# Patient Record
Sex: FEMALE | Race: WHITE | NOT HISPANIC OR LATINO | Employment: FULL TIME | URBAN - METROPOLITAN AREA
[De-identification: names, ages, dates, MRNs, and addresses within clinical notes are randomized per-mention and may not be internally consistent; named-entity substitution may affect disease eponyms.]

---

## 2017-08-09 ENCOUNTER — HOSPITAL ENCOUNTER (OUTPATIENT)
Dept: MAMMOGRAPHY | Facility: HOSPITAL | Age: 57
Discharge: HOME | End: 2017-08-09
Attending: FAMILY MEDICINE | Admitting: FAMILY MEDICINE

## 2017-08-11 ENCOUNTER — HOSPITAL ENCOUNTER (OUTPATIENT)
Dept: FAMILY MEDICINE CLINIC | Facility: CLINIC | Age: 57
Setting detail: SPECIMEN
Discharge: HOME | End: 2017-08-11
Attending: FAMILY MEDICINE | Admitting: FAMILY MEDICINE

## 2018-01-22 ENCOUNTER — HOSPITAL ENCOUNTER (OUTPATIENT)
Dept: FAMILY MEDICINE CLINIC | Facility: CLINIC | Age: 58
Setting detail: SPECIMEN
Discharge: HOME | End: 2018-01-22
Attending: FAMILY MEDICINE | Admitting: FAMILY MEDICINE

## 2018-01-22 LAB
ALBUMIN SERPL-MCNC: 4.1 G/DL (ref 3.5–4.8)
ALBUMIN/GLOB SERPL: 1.2 {RATIO} (ref 1–1.7)
ALP SERPL-CCNC: 78 IU/L (ref 32–91)
ALT SERPL-CCNC: 21 IU/L (ref 14–54)
ANION GAP SERPL CALC-SCNC: 13.7 MMOL/L (ref 10–20)
AST SERPL-CCNC: 26 IU/L (ref 15–41)
BACTERIA SPEC AEROBE CULT: NORMAL
BASOPHILS # BLD AUTO: 0 10*3/UL (ref 0–0.2)
BASOPHILS NFR BLD AUTO: 1 % (ref 0–2)
BILIRUB SERPL-MCNC: 0.5 MG/DL (ref 0.3–1.2)
BUN SERPL-MCNC: 12 MG/DL (ref 8–20)
BUN/CREAT SERPL: 9.2 (ref 5.4–26.2)
CALCIUM SERPL-MCNC: 10 MG/DL (ref 8.9–10.3)
CHLORIDE SERPL-SCNC: 100 MMOL/L (ref 101–111)
CONV CO2: 27 MMOL/L (ref 22–32)
CONV TOTAL PROTEIN: 7.6 G/DL (ref 6.1–7.9)
CREAT UR-MCNC: 1.3 MG/DL (ref 0.4–1)
DIFFERENTIAL METHOD BLD: (no result)
EOSINOPHIL # BLD AUTO: 0.2 10*3/UL (ref 0–0.3)
EOSINOPHIL # BLD AUTO: 2 % (ref 0–3)
ERYTHROCYTE [DISTWIDTH] IN BLOOD BY AUTOMATED COUNT: 13.8 % (ref 11.5–14.5)
GLOBULIN UR ELPH-MCNC: 3.5 G/DL (ref 2.5–3.8)
GLUCOSE SERPL-MCNC: 147 MG/DL (ref 65–99)
HCT VFR BLD AUTO: 44.1 % (ref 35–49)
HGB BLD-MCNC: 14.8 G/DL (ref 12–15)
LIPASE SERPL-CCNC: 15 U/L (ref 22–51)
LYMPHOCYTES # BLD AUTO: 1.6 10*3/UL (ref 0.8–4.8)
LYMPHOCYTES NFR BLD AUTO: 21 % (ref 18–42)
Lab: NORMAL
MCH RBC QN AUTO: 28.5 PG (ref 26–32)
MCHC RBC AUTO-ENTMCNC: 33.7 G/DL (ref 32–36)
MCV RBC AUTO: 84.8 FL (ref 80–94)
MICRO REPORT STATUS: NORMAL
MONOCYTES # BLD AUTO: 0.6 10*3/UL (ref 0.1–1.3)
MONOCYTES NFR BLD AUTO: 8 % (ref 2–11)
NEUTROPHILS # BLD AUTO: 5 10*3/UL (ref 2.3–8.6)
NEUTROPHILS NFR BLD AUTO: 68 % (ref 50–75)
NRBC BLD AUTO-RTO: 0 /100{WBCS}
NRBC/RBC NFR BLD MANUAL: 0 10*3/UL
PLATELET # BLD AUTO: 291 10*3/UL (ref 150–450)
PMV BLD AUTO: 8.4 FL (ref 7.4–10.4)
POTASSIUM SERPL-SCNC: 4.7 MMOL/L (ref 3.6–5.1)
RBC # BLD AUTO: 5.2 10*6/UL (ref 4–5.4)
SODIUM SERPL-SCNC: 136 MMOL/L (ref 136–144)
SPECIMEN SOURCE: NORMAL
WBC # BLD AUTO: 7.4 10*3/UL (ref 4.5–11.5)

## 2018-08-06 ENCOUNTER — HOSPITAL ENCOUNTER (OUTPATIENT)
Dept: MAMMOGRAPHY | Facility: HOSPITAL | Age: 58
Discharge: HOME | End: 2018-08-06
Attending: FAMILY MEDICINE | Admitting: FAMILY MEDICINE

## 2019-06-11 ENCOUNTER — TRANSCRIBE ORDERS (OUTPATIENT)
Dept: ADMINISTRATIVE | Facility: HOSPITAL | Age: 59
End: 2019-06-11

## 2019-06-11 DIAGNOSIS — Z12.31 SCREENING MAMMOGRAM, ENCOUNTER FOR: Primary | ICD-10-CM

## 2019-08-04 PROBLEM — Z01.419 ENCOUNTER FOR ROUTINE GYNECOLOGICAL EXAMINATION: Status: ACTIVE | Noted: 2017-08-11

## 2019-08-04 PROBLEM — Z12.31 VISIT FOR SCREENING MAMMOGRAM: Status: ACTIVE | Noted: 2018-07-20

## 2019-08-04 PROBLEM — E78.5 HYPERLIPIDEMIA: Status: ACTIVE | Noted: 2019-08-04

## 2019-08-04 RX ORDER — ASPIRIN 81 MG/1
TABLET ORAL
COMMUNITY
Start: 2011-11-23 | End: 2020-02-14

## 2019-08-04 RX ORDER — BLOOD SUGAR DIAGNOSTIC
STRIP MISCELLANEOUS
Status: ON HOLD | COMMUNITY
Start: 2013-05-08 | End: 2023-02-15

## 2019-08-04 RX ORDER — SIMVASTATIN 10 MG
TABLET ORAL
COMMUNITY
Start: 2011-11-23 | End: 2019-08-05

## 2019-08-05 ENCOUNTER — OFFICE VISIT (OUTPATIENT)
Dept: FAMILY MEDICINE CLINIC | Facility: CLINIC | Age: 59
End: 2019-08-05

## 2019-08-05 VITALS
HEART RATE: 56 BPM | HEIGHT: 66 IN | SYSTOLIC BLOOD PRESSURE: 154 MMHG | RESPIRATION RATE: 16 BRPM | WEIGHT: 218 LBS | BODY MASS INDEX: 35.03 KG/M2 | DIASTOLIC BLOOD PRESSURE: 67 MMHG | OXYGEN SATURATION: 98 % | TEMPERATURE: 97 F

## 2019-08-05 DIAGNOSIS — Z01.411 ENCOUNTER FOR GYNECOLOGICAL EXAMINATION WITH ABNORMAL FINDING: Primary | ICD-10-CM

## 2019-08-05 DIAGNOSIS — H91.90 HEARING LOSS, UNSPECIFIED HEARING LOSS TYPE, UNSPECIFIED LATERALITY: ICD-10-CM

## 2019-08-05 PROCEDURE — 99396 PREV VISIT EST AGE 40-64: CPT | Performed by: FAMILY MEDICINE

## 2019-08-05 PROCEDURE — G0148 SCR C/V CYTO, AUTOSYS, RESCR: HCPCS

## 2019-08-05 PROCEDURE — 87624 HPV HI-RISK TYP POOLED RSLT: CPT | Performed by: FAMILY MEDICINE

## 2019-08-05 RX ORDER — ROSUVASTATIN CALCIUM 10 MG/1
20 TABLET, COATED ORAL DAILY
Refills: 5 | COMMUNITY
Start: 2019-06-12

## 2019-08-05 NOTE — PROGRESS NOTES
Subjective   Chief Complaint   Patient presents with   • Gynecologic Exam     Ron Caba is a 59 y.o. female.     Patient Care Team:  Hailey Rae MD as PCP - General    She is coming in today for her annual gynecological exam.  She reports doing well.  She reports that over the last several months she noted some hearing issues on and off, but it is not all the time.  She especially has hard time to hear people when they are turned away from her.  She is not sexually active.  She denies any vaginal discharge or bleeding.  She denies any breast concerns.Patient denies any chest pain, shortness of breath, dizziness, nausea, vomiting, or diarrhea. No visual issues reported. No headaches, numbness or tingling. No urinary issues. Patient has good appetite and denies any weight changes. No swelling reported. No emotional issues.           The following portions of the patient's history were reviewed and updated as appropriate: allergies, current medications, past family history, past medical history, past social history, past surgical history and problem list.  Past Medical History:   Diagnosis Date   • Diabetes type 1, uncontrolled (CMS/[facility])    • Hyperlipidemia    • Vitamin D deficiency      Past Surgical History:   Procedure Laterality Date   • CATARACT EXTRACTION     •  SECTION     • COLONOSCOPY  2013   • ENDOMETRIAL ABLATION       The patient has a family history of  Family History   Problem Relation Age of Onset   • Hypertension Mother    • Hyperlipidemia Mother    • Hypothyroidism Mother    • Hypertension Father    • Hyperlipidemia Father    • Osteoarthritis Father      Social History     Socioeconomic History   • Marital status: Single     Spouse name: Not on file   • Number of children: Not on file   • Years of education: Not on file   • Highest education level: Not on file   Tobacco Use   • Smoking status: Never Smoker   • Smokeless tobacco: Never Used   Substance and Sexual  "Activity   • Alcohol use: No     Frequency: Never   • Drug use: No   • Sexual activity: Yes       Review of Systems   Constitutional: Negative for activity change, appetite change, chills, fatigue, fever, unexpected weight gain and unexpected weight loss.   HENT: Positive for hearing loss. Negative for congestion, ear pain, nosebleeds, postnasal drip, tinnitus and trouble swallowing.    Eyes: Negative for blurred vision, double vision and visual disturbance.   Respiratory: Negative for cough, choking, chest tightness, shortness of breath, wheezing and stridor.    Cardiovascular: Negative for chest pain, palpitations and leg swelling.   Gastrointestinal: Negative for abdominal distention, abdominal pain, anal bleeding, constipation, diarrhea, nausea, vomiting and GERD.   Endocrine: Negative for cold intolerance, heat intolerance and polyphagia.   Genitourinary: Negative for dysuria, flank pain, frequency, hematuria, pelvic pain, urgency, vaginal pain, breast lump and breast pain.   Musculoskeletal: Negative for arthralgias, back pain, gait problem, joint swelling and neck pain.   Skin: Negative for color change, dry skin and skin lesions.   Allergic/Immunologic: Negative for environmental allergies and food allergies.   Neurological: Negative for dizziness, tremors, speech difficulty, light-headedness, numbness, headache and confusion.   Hematological: Negative for adenopathy. Does not bruise/bleed easily.   Psychiatric/Behavioral: Negative for decreased concentration, sleep disturbance, depressed mood and stress.     Visit Vitals  /67 (BP Location: Left arm, Patient Position: Sitting, Cuff Size: Adult)   Pulse 56   Temp 97 °F (36.1 °C) (Oral)   Resp 16   Ht 166.4 cm (65.5\")   Wt 98.9 kg (218 lb)   SpO2 98%   BMI 35.73 kg/m²       Current Outpatient Medications:   •  aspirin (ASPIR-LOW) 81 MG EC tablet, ASPIR-LOW 81 MG TBEC, Disp: , Rfl:   •  insulin lispro (HUMALOG) 100 UNIT/ML injection, HUMALOG 100 UNIT/ML " "SOLN, Disp: , Rfl:   •  Insulin Syringe-Needle U-100 (B-D INSULIN SYRINGE) 31G X 5/16\" 0.3 ML misc, BD SAFETYGLIDE INSULIN SYRINGE 31G X 5/16\" 0.3 ML, Disp: , Rfl:   •  rosuvastatin (CRESTOR) 10 MG tablet, Take 10 mg by mouth Daily., Disp: , Rfl: 5    Objective   Physical Exam   Constitutional: She is oriented to person, place, and time. She appears well-developed and well-nourished. No distress.   HENT:   Head: Normocephalic and atraumatic.   Right Ear: External ear normal.   Left Ear: External ear normal.   Mouth/Throat: Oropharynx is clear and moist.   Eyes: Conjunctivae and EOM are normal. Pupils are equal, round, and reactive to light. Right eye exhibits no discharge. Left eye exhibits no discharge. No scleral icterus.   Neck: Normal range of motion. Neck supple. No JVD present. No thyromegaly present.   Cardiovascular: Normal rate, regular rhythm, normal heart sounds and intact distal pulses. Exam reveals no gallop and no friction rub.   No murmur heard.  Pulmonary/Chest: Effort normal and breath sounds normal. No respiratory distress. She has no wheezes. She has no rales. Right breast exhibits no inverted nipple, no mass, no skin change and no tenderness. Left breast exhibits no inverted nipple, no mass, no nipple discharge, no skin change and no tenderness. There is no breast swelling.   Abdominal: Soft. Bowel sounds are normal. She exhibits no distension and no mass. There is no tenderness. There is no rebound and no guarding. No hernia.   Genitourinary: Cervix normal. Uterus is not enlarged, fixed, tender, mobile or exhibiting a mass.   Cervix is not parous and not nulliparous. Cervix does not exhibit motion tenderness, discharge, friability, lesion, polyp or nabothian cyst. Right adnexum displays no mass, no tenderness and no fullness. Right adnexum is present.Left adnexum displays no mass, no tenderness and no fullness. Left adnexum is present.Vagina exhibits no lesion and no loss of rugae. No erythema, " tenderness or bleeding in the vagina. No foreign body in the vagina. No signs of injury around the vagina. No vaginal discharge found.   Musculoskeletal: Normal range of motion. She exhibits no edema, tenderness or deformity.   Lymphadenopathy:     She has no cervical adenopathy.   Neurological: She is alert and oriented to person, place, and time. She displays normal reflexes. No cranial nerve deficit or sensory deficit.   Skin: Skin is warm and dry. Capillary refill takes less than 2 seconds. No rash noted. She is not diaphoretic. No erythema. No pallor.   Psychiatric: She has a normal mood and affect. Her behavior is normal. Judgment normal.   Nursing note and vitals reviewed.         Lab Results   Component Value Date    TSH 5.640 (H) 07/29/2019          Assessment/Plan   Problems Addressed this Visit        Other    Encounter for routine gynecological examination - Primary    Relevant Orders    PapIG HPV Age Gdln ACOG      Other Visit Diagnoses     Hearing loss, unspecified hearing loss type, unspecified laterality        Relevant Orders    Ambulatory Referral to ENT (Otolaryngology)        Annual GYN exam was done today.  She is scheduled for mammogram within the next few days.  She is up to speed with her colonoscopy at this point.  If it comes to her hearing loss your exam was done today and is intact.  She was however advised to get official hearing test and I will refer her to see ENT for that.  She gets her blood work done through her endocrinologist due to her history of diabetes.  I had access to some of these results today and those were reviewed and discussed with the patient.  Her hemoglobin A1c was 8 and her TSH was slightly elevated.  She has upcoming appointment with her endocrinologist actually tomorrow and those results will be addressed there.  Healthy lifestyle was discussed and reinforced.         Requested Prescriptions      No prescriptions requested or ordered in this encounter

## 2019-08-07 LAB
AGE GDLN ACOG TESTING: NORMAL
CHROM ANALY OVERALL INTERP-IMP: NORMAL
CONV .: NORMAL
CONV PERFORMED BY:: NORMAL
DX ICD CODE: NORMAL
HIV 1 & 2 AB SER-IMP: NORMAL
HPV I/H RISK 1 DNA CVX QL PROBE+SIG AMP: NEGATIVE
REF LAB TEST METHOD: NORMAL
STAT OF ADQ CVX/VAG CYTO-IMP: NORMAL

## 2019-08-09 ENCOUNTER — HOSPITAL ENCOUNTER (OUTPATIENT)
Dept: MAMMOGRAPHY | Facility: HOSPITAL | Age: 59
Discharge: HOME | End: 2019-08-09
Admitting: FAMILY MEDICINE

## 2019-08-09 DIAGNOSIS — Z12.31 SCREENING MAMMOGRAM, ENCOUNTER FOR: ICD-10-CM

## 2019-08-09 PROCEDURE — 77067 SCR MAMMO BI INCL CAD: CPT

## 2019-08-09 PROCEDURE — 77063 BREAST TOMOSYNTHESIS BI: CPT

## 2020-02-14 ENCOUNTER — OFFICE VISIT (OUTPATIENT)
Dept: FAMILY MEDICINE CLINIC | Facility: CLINIC | Age: 60
End: 2020-02-14

## 2020-02-14 ENCOUNTER — HOSPITAL ENCOUNTER (OUTPATIENT)
Dept: GENERAL RADIOLOGY | Facility: HOSPITAL | Age: 60
Discharge: HOME | End: 2020-02-14
Admitting: FAMILY MEDICINE

## 2020-02-14 VITALS
HEIGHT: 66 IN | DIASTOLIC BLOOD PRESSURE: 73 MMHG | RESPIRATION RATE: 16 BRPM | SYSTOLIC BLOOD PRESSURE: 136 MMHG | HEART RATE: 68 BPM | WEIGHT: 216 LBS | TEMPERATURE: 97.9 F | BODY MASS INDEX: 34.72 KG/M2 | OXYGEN SATURATION: 96 %

## 2020-02-14 DIAGNOSIS — M25.562 CHRONIC PAIN OF LEFT KNEE: Primary | ICD-10-CM

## 2020-02-14 DIAGNOSIS — E10.40 TYPE 1 DIABETES MELLITUS WITH DIABETIC NEUROPATHY (HCC): ICD-10-CM

## 2020-02-14 DIAGNOSIS — E78.2 MIXED HYPERLIPIDEMIA: ICD-10-CM

## 2020-02-14 DIAGNOSIS — E03.9 HYPOTHYROIDISM (ACQUIRED): ICD-10-CM

## 2020-02-14 DIAGNOSIS — G89.29 CHRONIC PAIN OF LEFT KNEE: ICD-10-CM

## 2020-02-14 DIAGNOSIS — G89.29 CHRONIC PAIN OF LEFT KNEE: Primary | ICD-10-CM

## 2020-02-14 DIAGNOSIS — M25.562 CHRONIC PAIN OF LEFT KNEE: ICD-10-CM

## 2020-02-14 PROCEDURE — 73560 X-RAY EXAM OF KNEE 1 OR 2: CPT

## 2020-02-14 PROCEDURE — 99214 OFFICE O/P EST MOD 30 MIN: CPT | Performed by: FAMILY MEDICINE

## 2020-02-14 RX ORDER — LEVOTHYROXINE SODIUM 0.03 MG/1
25 TABLET ORAL DAILY
COMMUNITY
Start: 2020-01-14 | End: 2020-07-09

## 2020-02-14 RX ORDER — ACETAMINOPHEN AND CODEINE PHOSPHATE 300; 30 MG/1; MG/1
1 TABLET ORAL NIGHTLY PRN
Qty: 10 TABLET | Refills: 0 | Status: SHIPPED | OUTPATIENT
Start: 2020-02-14 | End: 2020-04-03

## 2020-02-14 RX ORDER — PHENOL 1.4 %
600 AEROSOL, SPRAY (ML) MUCOUS MEMBRANE DAILY
COMMUNITY

## 2020-02-14 RX ORDER — PERPHENAZINE 16 MG/1
TABLET, FILM COATED ORAL
Status: ON HOLD | COMMUNITY
Start: 2020-01-18 | End: 2023-02-15

## 2020-02-14 RX ORDER — MELATONIN
1000 DAILY
COMMUNITY

## 2020-02-14 NOTE — PROGRESS NOTES
Subjective   Chief Complaint   Patient presents with   • Knee Pain     left   • Diabetes   • Hypothyroidism   • Hyperlipidemia     Ron Caba is a 60 y.o. female.     Patient Care Team:  Hailey Rae MD as PCP - General    She is coming in today to talk about the issues with her left knee.  She reports that she has been having pain in her left knee for at least 2 months.  It is getting worse however.  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  She has tried over-the-counter Aleve, which helps some.  The pain is worse with walking and she catches herself limping at times.  She also has more pain lately at night and it keeps her up at night.  She denies any swelling.  She denies any numbness or tingling.  While in the office today she also wants to follow-up on her chronic medical problems.  We are addressing her diabetes type 1, hyperlipidemia, and newly diagnosed hypothyroidism.  She recently had blood work done for her endocrinologist and she brought those results and and wants to review films.  She denies any polyuria or polydipsia.       The following portions of the patient's history were reviewed and updated as appropriate: allergies, current medications, past family history, past medical history, past social history, past surgical history and problem list.  Past Medical History:   Diagnosis Date   • Diabetes type 1, uncontrolled (CMS/HCC)    • Hyperlipidemia    • Hypothyroidism    • Vitamin D deficiency      Past Surgical History:   Procedure Laterality Date   • CATARACT EXTRACTION     •  SECTION     • COLONOSCOPY  2013   • ENDOMETRIAL ABLATION       The patient has a family history of  Family History   Problem Relation Age of Onset   • Hypertension Mother    • Hyperlipidemia Mother    • Hypothyroidism Mother    • Hypertension Father    •  "Hyperlipidemia Father    • Osteoarthritis Father      Social History     Socioeconomic History   • Marital status: Single     Spouse name: Not on file   • Number of children: Not on file   • Years of education: Not on file   • Highest education level: Not on file   Tobacco Use   • Smoking status: Never Smoker   • Smokeless tobacco: Never Used   Substance and Sexual Activity   • Alcohol use: No     Frequency: Never   • Drug use: No   • Sexual activity: Yes       Review of Systems   Constitutional: Negative for activity change, fatigue and fever.   Respiratory: Negative for cough, shortness of breath and wheezing.    Cardiovascular: Negative for chest pain, palpitations and leg swelling.   Gastrointestinal: Negative for constipation, diarrhea and indigestion.   Musculoskeletal: Positive for arthralgias. Negative for back pain, gait problem and joint swelling.   Skin: Negative for color change, dry skin and rash.   Neurological: Negative for tremors and headache.     Visit Vitals  /73 (BP Location: Left arm, Patient Position: Sitting, Cuff Size: Large Adult)   Pulse 68   Temp 97.9 °F (36.6 °C) (Oral)   Resp 16   Ht 166.4 cm (65.5\")   Wt 98 kg (216 lb)   SpO2 96%   BMI 35.40 kg/m²       Current Outpatient Medications:   •  calcium carbonate (OS-CAT) 600 MG tablet, Take 600 mg by mouth Daily., Disp: , Rfl:   •  cholecalciferol (VITAMIN D3) 25 MCG (1000 UT) tablet, Take 1,000 Units by mouth Daily., Disp: , Rfl:   •  acetaminophen-codeine (TYLENOL #3) 300-30 MG per tablet, Take 1 tablet by mouth At Night As Needed for Moderate Pain ., Disp: 10 tablet, Rfl: 0  •  CONTOUR NEXT TEST test strip, EST BLOOD SUGAR SIX TIMES D, Disp: , Rfl:   •  Insulin Syringe-Needle U-100 (B-D INSULIN SYRINGE) 31G X 5/16\" 0.3 ML misc, BD SAFETYGLIDE INSULIN SYRINGE 31G X 5/16\" 0.3 ML, Disp: , Rfl:   •  levothyroxine (SYNTHROID, LEVOTHROID) 25 MCG tablet, Take 25 mcg by mouth Daily., Disp: , Rfl:   •  NOVOLOG 100 UNIT/ML injection, INJECT " 100 UNITS INTO THE SKIN QD, Disp: , Rfl:   •  rosuvastatin (CRESTOR) 10 MG tablet, Take 10 mg by mouth Daily., Disp: , Rfl: 5    Objective   Physical Exam   Constitutional: She is oriented to person, place, and time. She appears well-developed and well-nourished.   HENT:   Head: Normocephalic and atraumatic.   Eyes: Pupils are equal, round, and reactive to light. Conjunctivae and EOM are normal.   Neck: Normal range of motion. Neck supple.   Cardiovascular: Normal rate, regular rhythm, normal heart sounds and intact distal pulses. Exam reveals no gallop.   No murmur heard.  Pulmonary/Chest: Effort normal and breath sounds normal. No respiratory distress. She has no rales. She exhibits no tenderness.   Musculoskeletal: Normal range of motion. She exhibits no edema or deformity.   Left knee was examined.  No obvious deformity or swelling noted.  Full range of motion, crepitus noted with passive movements.  There is some mild joint line palpation tenderness.   Neurological: She is alert and oriented to person, place, and time.   Skin: Skin is warm and dry.   Nursing note and vitals reviewed.           No visits with results within 7 Day(s) from this visit.   Latest known visit with results is:   Office Visit on 08/05/2019   Component Date Value Ref Range Status   • Age Gdln ACOG Testing 08/05/2019 30-65   Final   • Diagnosis 08/05/2019 Comment   Final    NEGATIVE FOR INTRAEPITHELIAL LESION OR MALIGNANCY.   • Specimen adequacy: 08/05/2019 Comment   Final    Satisfactory for evaluation.  Endocervical and/or squamous metaplastic  cells (endocervical component) are present.   • Clinician Provided ICD-10: 08/05/2019 Comment   Final    Z01.411   • Performed by: 08/05/2019 Comment   Final    Alisa Trent, Cytotechnologist (ASCP)   • . 08/05/2019 .   Final   • Note: 08/05/2019 Comment   Final    The Pap smear is a screening test designed to aid in the detection of  premalignant and malignant conditions of the uterine cervix.   It is not a  diagnostic procedure and should not be used as the sole means of detecting  cervical cancer.  Both false-positive and false-negative reports do occur.   • Method: 08/05/2019 Comment   Final    This liquid based ThinPrep(R) pap test was screened with the  use of an image guided system.   • HPV, high-risk 08/05/2019 Negative  Negative Final    This high-risk HPV test detects thirteen high-risk types  (16/18/31/33/35/39/45/51/52/56/58/59/68) without differentiation.         Lab Results   Component Value Date     (H) 01/20/2020    BUN 16 01/20/2020    CREATININE 0.9 01/20/2020     01/20/2020    K 4.3 01/20/2020     01/20/2020    CALCIUM 9.5 01/20/2020    ALBUMIN 4.0 01/20/2020    BILITOT 0.5 01/20/2020    ALKPHOS 77 01/20/2020    AST 21 01/20/2020    ALT 14 01/20/2020    CHLPL 169 01/20/2020    TRIG 55 01/20/2020    HDL 81 01/20/2020    VLDL 11 01/20/2020    LDL 77 01/20/2020    TSH 3.030 01/20/2020    KOYM59DI 35 01/20/2020          Assessment/Plan   Problems Addressed this Visit        Cardiovascular and Mediastinum    Hyperlipidemia       Endocrine    Type 1 diabetes mellitus with diabetic neuropathy (CMS/HCC)    Relevant Medications    NOVOLOG 100 UNIT/ML injection    Hypothyroidism (acquired)    Relevant Medications    levothyroxine (SYNTHROID, LEVOTHROID) 25 MCG tablet       Musculoskeletal and Integument    Chronic pain of left knee - Primary    Relevant Medications    acetaminophen-codeine (TYLENOL #3) 300-30 MG per tablet    Other Relevant Orders    XR Knee 1 or 2 View Left    MRI Knee Left Without Contrast        If it comes to her left knee problems this might be due to combination of arthritis and soft tissue injury.  I will be getting x-ray and getting MRI.  She will continue Aleve over-the-counter and I also gave her a few pain pills to take at night to help her with pain and help her rest better as pain keeps her up at night.  I also reviewed her medical problems and her  medications.  I also reviewed her blood work done on January 20 this year.  Her hemoglobin A1c was 7.6.  Her diabetes is pretty well controlled.  She was recently also diagnosed with hypothyroidism and she was just recently started on levothyroxine 25 mcg.             Requested Prescriptions     Signed Prescriptions Disp Refills   • acetaminophen-codeine (TYLENOL #3) 300-30 MG per tablet 10 tablet 0     Sig: Take 1 tablet by mouth At Night As Needed for Moderate Pain .

## 2020-02-26 ENCOUNTER — HOSPITAL ENCOUNTER (OUTPATIENT)
Dept: MRI IMAGING | Facility: HOSPITAL | Age: 60
Discharge: HOME | End: 2020-02-26
Admitting: FAMILY MEDICINE

## 2020-02-26 DIAGNOSIS — G89.29 CHRONIC PAIN OF LEFT KNEE: ICD-10-CM

## 2020-02-26 DIAGNOSIS — M25.562 CHRONIC PAIN OF LEFT KNEE: ICD-10-CM

## 2020-02-26 PROCEDURE — 73721 MRI JNT OF LWR EXTRE W/O DYE: CPT

## 2020-02-27 DIAGNOSIS — G89.29 CHRONIC PAIN OF LEFT KNEE: Primary | ICD-10-CM

## 2020-02-27 DIAGNOSIS — M25.562 CHRONIC PAIN OF LEFT KNEE: Primary | ICD-10-CM

## 2020-03-06 ENCOUNTER — OFFICE VISIT (OUTPATIENT)
Dept: ORTHOPEDIC SURGERY | Facility: CLINIC | Age: 60
End: 2020-03-06

## 2020-03-06 VITALS
SYSTOLIC BLOOD PRESSURE: 134 MMHG | BODY MASS INDEX: 35.99 KG/M2 | DIASTOLIC BLOOD PRESSURE: 76 MMHG | HEIGHT: 65 IN | WEIGHT: 216 LBS | HEART RATE: 56 BPM | RESPIRATION RATE: 18 BRPM

## 2020-03-06 DIAGNOSIS — M22.8X2 PATELLAR MALTRACKING, LEFT: ICD-10-CM

## 2020-03-06 DIAGNOSIS — G89.29 CHRONIC PAIN OF LEFT KNEE: Primary | ICD-10-CM

## 2020-03-06 DIAGNOSIS — M25.562 CHRONIC PAIN OF LEFT KNEE: Primary | ICD-10-CM

## 2020-03-06 PROCEDURE — 99203 OFFICE O/P NEW LOW 30 MIN: CPT | Performed by: FAMILY MEDICINE

## 2020-03-06 RX ORDER — MELOXICAM 15 MG/1
15 TABLET ORAL DAILY
Qty: 30 TABLET | Refills: 0 | Status: SHIPPED | OUTPATIENT
Start: 2020-03-06 | End: 2020-03-31

## 2020-03-06 NOTE — PROGRESS NOTES
Primary Care Sports Medicine Office Visit Note     Patient ID: Ron Caba is a 60 y.o. female.    Chief Complaint:  Chief Complaint   Patient presents with   • Left Knee - Initial Evaluation   Answers for HPI/ROS submitted by the patient on 3/3/2020   What is the primary reason for your visit?: Other  Please describe your symptoms.: Pain around the knee cap left knee  Have you had these symptoms before?: No  How long have you been having these symptoms?: 1-4 weeks ago  Please describe any probable cause for these symptoms. : Not sure    HPI:    Ms. Ron Caba is a 60 y.o. female who presents to the clinic today for L knee. 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 She states she took a step backward and felt an odd hyperextension. Moderate crepitus with extending the knee. Pain keeps her up at night. Has tried ice, heat, tylenol 3, NSAID's. No instability. No significant injury or fall. No history of sx on this knee.     Past Medical History:   Diagnosis Date   • Diabetes type 1, uncontrolled (CMS/HCC)    • Hyperlipidemia    • Hypothyroidism    • Vitamin D deficiency        Past Surgical History:   Procedure Laterality Date   • CATARACT EXTRACTION     •  SECTION     • COLONOSCOPY  2013   • ENDOMETRIAL ABLATION         Family History   Problem Relation Age of Onset   • Hypertension Mother    • Hyperlipidemia Mother    • Hypothyroidism Mother    • Hypertension Father    • Hyperlipidemia Father    • Osteoarthritis Father      Social History     Occupational History   • Not on file   Tobacco Use   • Smoking status: Never Smoker   • Smokeless tobacco: Never Used   Substance and Sexual Activity   • Alcohol use: No     Frequency: Never   • Drug use: No   • Sexual activity: Yes      Review of Systems   Constitutional: Negative for activity change and fever.   Respiratory: Negative for cough and shortness of breath.   "  Cardiovascular: Negative for chest pain.   Gastrointestinal: Negative for constipation, diarrhea, nausea and vomiting.   Musculoskeletal: Positive for arthralgias.   Skin: Negative for color change and rash.   Neurological: Negative for weakness.   Hematological: Does not bruise/bleed easily.       Objective:    /76 (BP Location: Left arm, Patient Position: Sitting, Cuff Size: Large Adult)   Pulse 56   Resp 18   Ht 165.1 cm (65\")   Wt 98 kg (216 lb)   BMI 35.94 kg/m²     Physical Examination:  Physical Exam   Constitutional: She appears well-developed and well-nourished. No distress.   HENT:   Head: Normocephalic and atraumatic.   Eyes: Conjunctivae are normal.   Cardiovascular: Intact distal pulses.   Pulmonary/Chest: Effort normal. No respiratory distress.   Musculoskeletal:        Left knee: She exhibits effusion (Trace).   Neurological: She is alert.   Skin: Skin is warm. Capillary refill takes less than 2 seconds. She is not diaphoretic.   Nursing note and vitals reviewed.    Left Knee Exam     Tenderness   The patient is experiencing tenderness in the lateral retinaculum and medial retinaculum.    Range of Motion   Extension: normal   Flexion: normal     Tests   Lucita:  Medial - negative Lateral - negative  Varus: negative Valgus: negative    Other   Erythema: absent  Sensation: normal  Pulse: present  Swelling: none  Effusion: effusion (Trace) present    Comments:  Palpable fullness to the posterior knee in the area of typical synovial cyst.  Negative patellar grind testing, negative Newell Aldo.          Imaging and other tests:  Three-view XR of the left knee, and MRI of the left knee dated 2/14/2020 in 2/26/2020 respectively, show very mild osteoarthritic changes.  MRI shows inflammatory increased signal in the area of the lateral suprapatellar fat pad, and medially in the Hoffa's fat pad.  No other meniscal or ligamentous pathology is seen.  Large Baker's cyst " "posterolaterally.    Assessment and Plan:    1. Chronic pain of left knee  - Ambulatory Referral to Physical Therapy Evaluate and treat (s&s, vmo Strength, patellar tracking ); ROM  - meloxicam (MOBIC) 15 MG tablet; Take 1 tablet by mouth Daily.  Dispense: 30 tablet; Refill: 0    2. Patellar maltracking, left    I discussed with this patient today that her MRI is very reassuring for mechanical issue.  She does seem to have some excessive fluid in the knee with the presence of her Baker's cyst.  However, she does not have a significant amount of osteoarthritic change for her age.  I feel likely she is dealing with some patellar retinacular disease, but could maltracking.  She is a diabetic, but has good kidney function, therefore we will start her on short-term meloxicam.  I would like for her to discontinue this medication in less than 3 months from start date.  I would also like for her to follow-up with physical therapy for strengthening of VMO and improve patellar tracking.  RTC in 3 months.    Rocael PÉREZ \"Chance\" Wesley SALINAS DO, CAQSM  03/06/20  9:22 AM    Disclaimer: Please note that areas of this note were completed with computer voice recognition software.  Quite often unanticipated grammatical, syntax, homophones, and other interpretive errors are inadvertently transcribed by the computer software. Please excuse any errors that have escaped final proofreading.  "

## 2020-03-16 ENCOUNTER — TREATMENT (OUTPATIENT)
Dept: PHYSICAL THERAPY | Facility: CLINIC | Age: 60
End: 2020-03-16

## 2020-03-16 DIAGNOSIS — M25.562 CHRONIC PAIN OF LEFT KNEE: Primary | ICD-10-CM

## 2020-03-16 DIAGNOSIS — R26.2 DIFFICULTY WALKING: ICD-10-CM

## 2020-03-16 DIAGNOSIS — G89.29 CHRONIC PAIN OF LEFT KNEE: Primary | ICD-10-CM

## 2020-03-16 PROCEDURE — 97110 THERAPEUTIC EXERCISES: CPT | Performed by: PHYSICAL THERAPIST

## 2020-03-16 PROCEDURE — 97535 SELF CARE MNGMENT TRAINING: CPT | Performed by: PHYSICAL THERAPIST

## 2020-03-16 PROCEDURE — 97161 PT EVAL LOW COMPLEX 20 MIN: CPT | Performed by: PHYSICAL THERAPIST

## 2020-03-16 NOTE — PROGRESS NOTES
Physical Therapy Initial Evaluation and Plan of Care    Patient: Ron Caba   : 1960  Diagnosis/ICD-10 Code:  Chronic pain of left knee [M25.562, G89.29]  Referring practitioner: Rocael Olson I*  Date of Initial Visit: 3/16/2020  Today's Date: 3/16/2020  Patient seen for 1 sessions           Subjective Questionnaire: Oxford Knee: 28/48, 58% function       Subjective Evaluation    History of Present Illness  Mechanism of injury: Pt injured (L) knee while referrering a wrestling match on 2020 and stepped back, felt a lot of pressure and pain as she stepped back. Did not feel a pop but felt like she was going to get a pop but was able to prevent that from happening.     Pt is an  - art and music     Pain while not using the leg, usually when sitting and laying down. Not as bad when she is walking or standing. Pain is much better now compared to when it happened, is using Meloxicam daily. Pt is still waking up in the middle of the night d/t pain.     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, will be expected to do this again in April (beginning/mid)       Patient Occupation:   Quality of life: good    Pain  Current pain ratin  At best pain ratin  At worst pain ratin  Quality: dull ache, tight, pressure, discomfort and throbbing  Relieving factors: relaxation, change in position and medications  Aggravating factors: sleeping  Progression: improved    Treatments  No previous or current treatments  Patient Goals  Patient goals for therapy: decreased edema, decreased pain, increased motion, increased strength, return to sport/leisure activities and return to work             Objective       Active Range of Motion   Left Knee   Flexion: 115 degrees   Extension: 0 degrees     Right Knee   Flexion: 119 degrees   Extension: 0 degrees     Patellar Mobility   Left Knee Hypomobile in the left medial, left  lateral, left superior and left inferior patellar tendon(s).     Right Knee Patellar tendons within functional limits include the medial, lateral, superior and inferior.     Strength/Myotome Testing     Left Hip   Planes of Motion   Flexion: 4-  Extension: 4-  Abduction: 4-  Adduction: 4  External rotation: 4-  Internal rotation: 4-    Left Knee   Flexion: 4-  Extension: 4-  Quadriceps contraction: fair    Right Knee   Flexion: 4+  Extension: 4+  Quadriceps contraction: good    Additional Strength Details  Pain with hip IR/ER MMT     Tests     Left Knee   Positive valgus stress test at 30 degrees.   Negative anterior drawer, lateral Lucita, medial Lucita and posterior drawer.     Additional Tests Details  Pain at lateral knee during valgus stress test          Assessment & Plan     Assessment  Impairments: abnormal or restricted ROM, activity intolerance, impaired physical strength, lacks appropriate home exercise program and pain with function  Assessment details: Pt is a 60 yr/o female presenting with (L) knee pain which began in the beginning of February while refereeing a wrestling match. Per Waldo Knee she reports 58% function. She shows pain at lateral joint line with valgus stress test, as well as pain with multiple MMT. Clicking noted with Lucita testing at patella, no pain. Educated on anatomy of the knee, as well as patellar tracking issues, pt with good understanding. Recommend skilled OPPT to address the above issues, pt in agreement.   Prognosis: good  Functional Limitations: sleeping, uncomfortable because of pain, sitting and unable to perform repetitive tasks  Goals  Plan Goals: STG: to be met within 6 visits   1. Pt to show improved pain levels 0-3/10 at worst with activity   2. Pt to show equal ROM in (B) knees with no pain  3. Pt to show painfree muscle testing in all areas of LLE   4. Pt to report 25% decrease in night wakings/ night pain from L knee     LTG: to be met by DC   1. Pt to be  (I) with finalized HEP  2. Pt to show improved strength to 5/5 with no pain in LLE   3. Pt to report improved function per Oxford Knee to greater than 80%   4. Negative for pain with special testing in L knee     Plan  Therapy options: will be seen for skilled physical therapy services  Planned modality interventions: cryotherapy, electrical stimulation/Russian stimulation and thermotherapy (hydrocollator packs)  Planned therapy interventions: flexibility, functional ROM exercises, gait training, home exercise program, joint mobilization, manual therapy, neuromuscular re-education, soft tissue mobilization, spinal/joint mobilization, strengthening, stretching and therapeutic activities  Frequency: 2x week  Duration in visits: 20  Treatment plan discussed with: patient        Timed:         Manual Therapy:    2     mins  82353;     Therapeutic Exercise:    16     mins  88023;     Neuromuscular Leonidas:        mins  41458;    Therapeutic Activity:          mins  40245;     Gait Training:           mins  93143;     Ultrasound:          mins  96433;    Ionto                                  mins   28583  Self Care                       10     mins   57306  Canalith Repos         mins 63243      Un-Timed:  Electrical Stimulation:         mins  02972 ( );  Traction          mins 59617  Low Eval     21     Mins  85970  Mod Eval          Mins  46344  High Eval                            Mins  99682  Re-Eval                               mins  22473        Timed Treatment:   28   mins   Total Treatment:     54   mins    PT SIGNATURE: Joselin Mccain, PT   DATE TREATMENT INITIATED: 3/16/2020    Initial Certification  Certification Period: 6/14/2020  I certify that the therapy services are furnished while this patient is under my care.  The services outlined above are required by this patient, and will be reviewed every 90 days.     PHYSICIAN: Rocael Olson II, DO      DATE:     Please sign and return via fax to   .. Thank you, [facility].

## 2020-03-31 DIAGNOSIS — M25.562 CHRONIC PAIN OF LEFT KNEE: ICD-10-CM

## 2020-03-31 DIAGNOSIS — G89.29 CHRONIC PAIN OF LEFT KNEE: ICD-10-CM

## 2020-03-31 RX ORDER — MELOXICAM 15 MG/1
15 TABLET ORAL DAILY
Qty: 30 TABLET | Refills: 0 | Status: SHIPPED | OUTPATIENT
Start: 2020-03-31 | End: 2020-08-05

## 2020-04-03 ENCOUNTER — E-VISIT (OUTPATIENT)
Dept: FAMILY MEDICINE CLINIC | Facility: CLINIC | Age: 60
End: 2020-04-03

## 2020-04-03 DIAGNOSIS — J01.00 SUBACUTE MAXILLARY SINUSITIS: Primary | ICD-10-CM

## 2020-04-03 PROCEDURE — 99421 OL DIG E/M SVC 5-10 MIN: CPT | Performed by: FAMILY MEDICINE

## 2020-04-03 RX ORDER — AMOXICILLIN AND CLAVULANATE POTASSIUM 875; 125 MG/1; MG/1
1 TABLET, FILM COATED ORAL 2 TIMES DAILY
Qty: 20 TABLET | Refills: 0 | Status: SHIPPED | OUTPATIENT
Start: 2020-04-03 | End: 2020-08-05

## 2020-04-03 NOTE — PROGRESS NOTES
Ron Caba    1960  [id number]    I have reviewed the e-Visit questionnaire and patient's answers, my assessment and plan are as follows:    HPI    This is 60-year-old female who is complaining about some pain around the nose and face, she has been also having some headaches and scratchy throat.  Her symptoms have been present for a few days.  No fever reported.  She is not a smoker and she has never smoked.  She had similar problems in the past.  She has tried some over-the-counter decongestant and Tylenol.  No cough reported.    Review of Systems -her review of system is positive for some facial pain and headache as well as sore throat.  Her review of system is negative for fever, cough, or shortness of breath.      Diagnoses and all orders for this visit:    Subacute maxillary sinusitis    Other orders  -     amoxicillin-clavulanate (Augmentin) 875-125 MG per tablet; Take 1 tablet by mouth 2 (Two) Times a Day.    According to to the description of your symptoms I suspect that you have sinus infection.  I am sending a prescription for antibiotic for you.  I also recommend to start over-the-counter Flonase or Nasacort nasal sprays.  Please closely monitor your symptoms and contact us back if any worsening or new symptoms develop.  If you have any questions or concerns about my evaluation please contact us back.  We also have video visit and telephone visit available.  If at any point you need more attention please do not hesitate to reach out back to us.  Stay safe and healthy.    I spent 5-10 minutes addressing patient's concerns and symptoms and working on this encounter.    Any medications prescribed have been sent electronically to   [facility][id number] - [facility] [place] - [address] [place] [address] AT [address] [place] [address] & [facility] - [phone]  - [phone] [address]  [address]  [place] [address]  Phone: [phone] Fax: [phone]        Hailey Rae MD  04/03/20  11:51  AM

## 2020-04-03 NOTE — PATIENT INSTRUCTIONS
According to to the description of your symptoms I suspect that you have sinus infection.  I am sending a prescription for antibiotic for you.  I also recommend to start over-the-counter Flonase or Nasacort nasal sprays.  Please closely monitor your symptoms and contact us back if any worsening or new symptoms develop.  If you have any questions or concerns about my evaluation please contact us back.  We also have video visit and telephone visit available.  If at any point you need more attention please do not hesitate to reach out back to us.  Stay safe and healthy.

## 2020-06-15 ENCOUNTER — DOCUMENTATION (OUTPATIENT)
Dept: PHYSICAL THERAPY | Facility: CLINIC | Age: 60
End: 2020-06-15

## 2020-06-15 NOTE — PROGRESS NOTES
Discharge Summary  Discharge Summary from Physical Therapy Report      Dates  PT visit: 3/16/2020  Number of Visits: 1    Goals: Not Met    Discharge Plan: Continue with current home exercise program as instructed    Comments : Pt present for eval only. Not seen following d/t covid-19 precautions and pt did not return f/u calls. Pt not present for discharge, therefore no functional measures taken. See last note for most updated information.    Date of Discharge 6/15/2020        Joselin Mccain, PT  Physical Therapist

## 2020-07-09 DIAGNOSIS — Z12.31 VISIT FOR SCREENING MAMMOGRAM: Primary | ICD-10-CM

## 2020-08-05 ENCOUNTER — OFFICE VISIT (OUTPATIENT)
Dept: FAMILY MEDICINE CLINIC | Facility: CLINIC | Age: 60
End: 2020-08-05

## 2020-08-05 VITALS
HEART RATE: 75 BPM | SYSTOLIC BLOOD PRESSURE: 136 MMHG | RESPIRATION RATE: 16 BRPM | BODY MASS INDEX: 36.82 KG/M2 | HEIGHT: 65 IN | OXYGEN SATURATION: 97 % | WEIGHT: 221 LBS | TEMPERATURE: 97.1 F | DIASTOLIC BLOOD PRESSURE: 72 MMHG

## 2020-08-05 DIAGNOSIS — Z23 NEED FOR VACCINATION: ICD-10-CM

## 2020-08-05 DIAGNOSIS — Z00.00 PREVENTATIVE HEALTH CARE: Primary | ICD-10-CM

## 2020-08-05 PROCEDURE — 90632 HEPA VACCINE ADULT IM: CPT | Performed by: FAMILY MEDICINE

## 2020-08-05 PROCEDURE — 90472 IMMUNIZATION ADMIN EACH ADD: CPT | Performed by: FAMILY MEDICINE

## 2020-08-05 PROCEDURE — 90471 IMMUNIZATION ADMIN: CPT | Performed by: FAMILY MEDICINE

## 2020-08-05 PROCEDURE — 99396 PREV VISIT EST AGE 40-64: CPT | Performed by: FAMILY MEDICINE

## 2020-08-05 PROCEDURE — 90750 HZV VACC RECOMBINANT IM: CPT | Performed by: FAMILY MEDICINE

## 2020-08-05 RX ORDER — LEVOTHYROXINE SODIUM 0.03 MG/1
25 TABLET ORAL DAILY
COMMUNITY

## 2020-08-05 NOTE — PROGRESS NOTES
Subjective   Chief Complaint   Patient presents with   • Annual Exam     Ron Caba is a 60 y.o. female.     Patient Care Team:  Hailey Rae MD as PCP - General  Bhavana Turner PA (Physician Assistant)    She is coming in today for her annual wellness exam.  She reports doing well and she denies any new issues or complaints.  She is followed by endocrinology physician assistants for her type 1 diabetes, she is on the insulin pump.  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  She is scheduled for mammogram next week and she is scheduled for her annual eye exam later this week.  She would like her immunization to be reviewed today as well. Patient denies any chest pain, shortness of breath, dizziness, nausea, vomiting, or diarrhea. No visual issues reported. No headaches, numbness or tingling. No urinary issues. Patient has good appetite and denies any weight changes. No swelling reported. No emotional issues.         The following portions of the patient's history were reviewed and updated as appropriate: allergies, current medications, past family history, past medical history, past social history, past surgical history and problem list.  Past Medical History:   Diagnosis Date   • Allergic    • Arthritis    • Cataract    • Diabetes type 1, uncontrolled (CMS/[facility])    • Hyperlipidemia    • Hypothyroidism    • Osteopenia    • Vitamin D deficiency      Past Surgical History:   Procedure Laterality Date   • CATARACT EXTRACTION     •  SECTION     • COLONOSCOPY  2013   • ENDOMETRIAL ABLATION       The patient has a family history of  Family History   Problem Relation Age of Onset   • Hypertension Mother    • Hyperlipidemia Mother    • Hypothyroidism Mother    • Hypertension Father    • Hyperlipidemia Father    • Osteoarthritis Father      Social History     Socioeconomic History   • Marital status:      Spouse  "name: Not on file   • Number of children: Not on file   • Years of education: Not on file   • Highest education level: Not on file   Tobacco Use   • Smoking status: Never Smoker   • Smokeless tobacco: Never Used   Substance and Sexual Activity   • Alcohol use: No     Frequency: Never   • Drug use: No   • Sexual activity: Yes       Review of Systems   Constitutional: Negative for activity change, appetite change, chills, fatigue, fever, unexpected weight gain and unexpected weight loss.   HENT: Negative for congestion, ear pain, hearing loss, nosebleeds, postnasal drip, swollen glands, tinnitus and trouble swallowing.    Eyes: Negative for blurred vision, double vision and visual disturbance.   Respiratory: Negative for cough, choking, chest tightness, shortness of breath, wheezing and stridor.    Cardiovascular: Negative for chest pain, palpitations and leg swelling.   Gastrointestinal: Negative for abdominal distention, abdominal pain, anal bleeding, constipation, diarrhea, nausea, vomiting and GERD.   Endocrine: Negative for cold intolerance, heat intolerance and polyphagia.   Genitourinary: Negative for dysuria, flank pain, frequency, hematuria and urgency.   Musculoskeletal: Negative for arthralgias, back pain, gait problem, joint swelling and neck pain.   Skin: Negative for color change, dry skin and skin lesions.   Allergic/Immunologic: Negative for environmental allergies and food allergies.   Neurological: Negative for dizziness, tremors, speech difficulty, light-headedness, numbness, headache and confusion.   Hematological: Negative for adenopathy. Does not bruise/bleed easily.   Psychiatric/Behavioral: Negative for decreased concentration, sleep disturbance, depressed mood and stress.     Visit Vitals  /72 (BP Location: Left arm, Patient Position: Sitting, Cuff Size: Large Adult)   Pulse 75   Temp 97.1 °F (36.2 °C) (Temporal)   Resp 16   Ht 165.1 cm (65\")   Wt 100 kg (221 lb)   SpO2 97%   BMI 36.78 " "kg/m²       Current Outpatient Medications:   •  levothyroxine (SYNTHROID, LEVOTHROID) 25 MCG tablet, Take 25 mcg by mouth Daily., Disp: , Rfl:   •  calcium carbonate (OS-CAT) 600 MG tablet, Take 600 mg by mouth Daily., Disp: , Rfl:   •  cholecalciferol (VITAMIN D3) 25 MCG (1000 UT) tablet, Take 1,000 Units by mouth Daily., Disp: , Rfl:   •  CONTOUR NEXT TEST test strip, EST BLOOD SUGAR SIX TIMES D, Disp: , Rfl:   •  Insulin Syringe-Needle U-100 (B-D INSULIN SYRINGE) 31G X 5/16\" 0.3 ML misc, BD SAFETYGLIDE INSULIN SYRINGE 31G X 5/16\" 0.3 ML, Disp: , Rfl:   •  NOVOLOG 100 UNIT/ML injection, INJECT 100 UNITS INTO THE SKIN QD, Disp: , Rfl:   •  rosuvastatin (CRESTOR) 10 MG tablet, Take 10 mg by mouth Daily., Disp: , Rfl: 5    Objective   Physical Exam   Constitutional: She is oriented to person, place, and time. She appears well-developed and well-nourished. No distress.   HENT:   Head: Normocephalic and atraumatic.   Right Ear: External ear normal.   Left Ear: External ear normal.   Mouth/Throat: Oropharynx is clear and moist.   Eyes: Pupils are equal, round, and reactive to light. Conjunctivae and EOM are normal. Right eye exhibits no discharge. Left eye exhibits no discharge. No scleral icterus.   Neck: Normal range of motion. Neck supple. No JVD present. No thyromegaly present.   Cardiovascular: Normal rate, regular rhythm, normal heart sounds and intact distal pulses. Exam reveals no gallop and no friction rub.   No murmur heard.  Pulmonary/Chest: Effort normal and breath sounds normal. No respiratory distress. She has no wheezes. She has no rales.   Abdominal: Soft. Bowel sounds are normal. She exhibits no distension and no mass. There is no tenderness. There is no rebound and no guarding. No hernia.   Musculoskeletal: Normal range of motion. She exhibits no edema, tenderness or deformity.   Lymphadenopathy:     She has no cervical adenopathy.   Neurological: She is alert and oriented to person, place, and time. " She displays normal reflexes. No cranial nerve deficit or sensory deficit.   Skin: Skin is warm and dry. Capillary refill takes less than 2 seconds. No rash noted. She is not diaphoretic. No erythema. No pallor.   Psychiatric: She has a normal mood and affect. Her behavior is normal. Judgment normal.   Nursing note and vitals reviewed.           No visits with results within 7 Day(s) from this visit.   Latest known visit with results is:   Office Visit on 08/05/2019   Component Date Value Ref Range Status   • Age Gdln ACOG Testing 08/05/2019 30-65   Final   • Diagnosis 08/05/2019 Comment   Final    NEGATIVE FOR INTRAEPITHELIAL LESION OR MALIGNANCY.   • Specimen adequacy: 08/05/2019 Comment   Final    Satisfactory for evaluation.  Endocervical and/or squamous metaplastic  cells (endocervical component) are present.   • Clinician Provided ICD-10: 08/05/2019 Comment   Final    Z01.411   • Performed by: 08/05/2019 Comment   Final    Alisa Trent, Cytotechnologist (ASCP)   • . 08/05/2019 .   Final   • Note: 08/05/2019 Comment   Final    The Pap smear is a screening test designed to aid in the detection of  premalignant and malignant conditions of the uterine cervix.  It is not a  diagnostic procedure and should not be used as the sole means of detecting  cervical cancer.  Both false-positive and false-negative reports do occur.   • Method: 08/05/2019 Comment   Final    This liquid based ThinPrep(R) pap test was screened with the  use of an image guided system.   • HPV, high-risk 08/05/2019 Negative  Negative Final    This high-risk HPV test detects thirteen high-risk types  (16/18/31/33/35/39/45/51/52/56/58/59/68) without differentiation.                 Assessment/Plan   Problems Addressed this Visit        Other    Preventative health care - Primary    Relevant Orders    Hepatitis C Antibody    CBC Auto Differential    Need for vaccination    Relevant Orders    Shingrix Vaccine (Completed)    Hepatitis A Vaccine  Adult IM (Completed)        Wellness exam was done today - see above for details. Healthy life style was reviewed and discussed and re-enforced. Regular exercise and healthy diet were also discussed and recommended.  She gets her blood work done through the endocrinologist.  Her last hemoglobin A1c was checked last month and it was 7.6.  I reviewed her health maintenance and her last colonoscopy was in April 2013 and 10-year follow-up was recommended.  She is scheduled for mammogram next week.  I also reviewed her immunization and she was given her second hepatitis A shot and first Shingrix.  She already had both of her pneumonia shots and she will be getting flu shot later this year.  Her last Pap smear was in August 2019.  She is due for hepatitis C screening and she wants to get this done with her labs in October through her endocrinologist.               Requested Prescriptions      No prescriptions requested or ordered in this encounter

## 2020-08-12 ENCOUNTER — HOSPITAL ENCOUNTER (OUTPATIENT)
Dept: MAMMOGRAPHY | Facility: HOSPITAL | Age: 60
Discharge: HOME | End: 2020-08-12
Admitting: FAMILY MEDICINE

## 2020-08-12 DIAGNOSIS — Z12.31 VISIT FOR SCREENING MAMMOGRAM: ICD-10-CM

## 2020-08-12 DIAGNOSIS — R92.8 ABNORMAL MAMMOGRAM OF RIGHT BREAST: Primary | ICD-10-CM

## 2020-08-12 PROCEDURE — 77063 BREAST TOMOSYNTHESIS BI: CPT

## 2020-08-12 PROCEDURE — 77067 SCR MAMMO BI INCL CAD: CPT

## 2020-08-21 ENCOUNTER — HOSPITAL ENCOUNTER (OUTPATIENT)
Dept: MAMMOGRAPHY | Facility: HOSPITAL | Age: 60
Discharge: HOME | End: 2020-08-21
Admitting: FAMILY MEDICINE

## 2020-08-21 ENCOUNTER — HOSPITAL ENCOUNTER (OUTPATIENT)
Dept: ULTRASOUND IMAGING | Facility: HOSPITAL | Age: 60
Discharge: HOME | End: 2020-08-21

## 2020-08-21 DIAGNOSIS — R92.8 ABNORMAL MAMMOGRAM OF RIGHT BREAST: ICD-10-CM

## 2020-08-21 PROCEDURE — G0279 TOMOSYNTHESIS, MAMMO: HCPCS

## 2020-08-21 PROCEDURE — 77065 DX MAMMO INCL CAD UNI: CPT

## 2020-08-27 ENCOUNTER — APPOINTMENT (OUTPATIENT)
Dept: ULTRASOUND IMAGING | Facility: HOSPITAL | Age: 60
End: 2020-08-27

## 2020-08-27 ENCOUNTER — APPOINTMENT (OUTPATIENT)
Dept: MAMMOGRAPHY | Facility: HOSPITAL | Age: 60
End: 2020-08-27

## 2020-12-03 ENCOUNTER — CLINICAL SUPPORT (OUTPATIENT)
Dept: FAMILY MEDICINE CLINIC | Facility: CLINIC | Age: 60
End: 2020-12-03

## 2020-12-03 DIAGNOSIS — Z23 NEED FOR VACCINATION: Primary | ICD-10-CM

## 2020-12-03 PROCEDURE — 90750 HZV VACC RECOMBINANT IM: CPT | Performed by: FAMILY MEDICINE

## 2020-12-03 PROCEDURE — 90471 IMMUNIZATION ADMIN: CPT | Performed by: FAMILY MEDICINE

## 2021-01-20 ENCOUNTER — TELEPHONE (OUTPATIENT)
Dept: FAMILY MEDICINE CLINIC | Facility: CLINIC | Age: 61
End: 2021-01-20

## 2021-01-20 NOTE — TELEPHONE ENCOUNTER
My records indicate that she has not had fasting blood work including hemoglobin A1c checked since January 2020.  I know that she sees an endocrinologist in [place].  Please call the patient to see when her last set of fasting blood work was and I would like to see a copy.  If it has not been done within the last 6 months she can certainly come here and have her blood work checked.  Thank you.

## 2021-01-20 NOTE — TELEPHONE ENCOUNTER
She has had a A1dca done in the last 6 months so they will sent that and she is going in next month to have more labs done so she will have them send to us. Thanks.

## 2021-02-04 ENCOUNTER — TELEPHONE (OUTPATIENT)
Dept: FAMILY MEDICINE CLINIC | Facility: CLINIC | Age: 61
End: 2021-02-04

## 2021-02-04 LAB — HBA1C MFR BLD: 7.7 % (ref 4.8–5.6)

## 2021-04-23 ENCOUNTER — TELEPHONE (OUTPATIENT)
Dept: FAMILY MEDICINE CLINIC | Facility: CLINIC | Age: 61
End: 2021-04-23

## 2021-04-23 RX ORDER — TOBRAMYCIN 3 MG/ML
1 SOLUTION/ DROPS OPHTHALMIC
Qty: 5 ML | Refills: 0 | Status: SHIPPED | OUTPATIENT
Start: 2021-04-23 | End: 2021-11-12

## 2021-04-23 NOTE — TELEPHONE ENCOUNTER
PATIENT ADVISED THAT THEY WERE IN CONTACT WITH A STUDENT WHO HAS PINK EYE. PATIENT IS REQUESTING A MEDICATION SO THAT SHE CAN AVOID GETTING IT.     [facility] #[id number] - [place], [place] - [address] [place] [address] & [facility] - [phone] [facility] [phone]

## 2021-04-23 NOTE — TELEPHONE ENCOUNTER
Please advise the patient that I sent in prescription for the eyedrops for her.  She needs to monitor her symptoms and call us back if no improvement.  Thank you.

## 2021-05-19 ENCOUNTER — TELEPHONE (OUTPATIENT)
Dept: FAMILY MEDICINE CLINIC | Facility: CLINIC | Age: 61
End: 2021-05-19

## 2021-05-19 ENCOUNTER — TELEMEDICINE (OUTPATIENT)
Dept: FAMILY MEDICINE CLINIC | Facility: CLINIC | Age: 61
End: 2021-05-19

## 2021-05-19 DIAGNOSIS — J01.00 SUBACUTE MAXILLARY SINUSITIS: Primary | ICD-10-CM

## 2021-05-19 PROCEDURE — 99213 OFFICE O/P EST LOW 20 MIN: CPT | Performed by: FAMILY MEDICINE

## 2021-05-19 RX ORDER — AMOXICILLIN AND CLAVULANATE POTASSIUM 875; 125 MG/1; MG/1
1 TABLET, FILM COATED ORAL 2 TIMES DAILY
Qty: 20 TABLET | Refills: 0 | Status: SHIPPED | OUTPATIENT
Start: 2021-05-19 | End: 2021-11-12

## 2021-05-19 NOTE — TELEPHONE ENCOUNTER
Patient sent me a request for an e- visit due to upper respiratory complaints.  Please call the patient to advise her that she needs to schedule the appointment instead.  If she does not want to come into the office it can possibly be a video appointment.  Thank you.

## 2021-05-19 NOTE — PROGRESS NOTES
Subjective   Chief Complaint   Patient presents with   • URI   • Sinusitis     1 and 1/2 weeks     Ron Caba is a 61 y.o. female.     Patient Care Team:  Hailey Rae MD as PCP - General  Bhavana Turner PA (Physician Assistant)     You have chosen to receive care through a telehealth visit.  Do you consent to use a video/audio connection for your medical care today? Yes      Patient is being evaluated today through telehealth medicine appointment via the video in view of COVID-19 pandemic. She reports that for the last week and a half or so she has been having some congestion in her sinuses and postnasal drainage. The pressure in the sinuses is getting worse over time. She has already some issues with seasonal allergies and she treats herself with over-the-counter medicines. She currently is using Flonase and that seems to help with the opening up her passages, however lately she started having some drainage and brings up some colorful stuff. She is already fully vaccinated against COVID-19.       The following portions of the patient's history were reviewed and updated as appropriate: allergies, current medications, past family history, past medical history, past social history, past surgical history and problem list.  Past Medical History:   Diagnosis Date   • Allergic    • Arthritis    • Breast cyst    • Cataract    • Diabetes type 1, uncontrolled (CMS/HCC)    • Fibrocystic breast    • Hyperlipidemia    • Hypothyroidism    • Osteopenia    • Vitamin D deficiency      Past Surgical History:   Procedure Laterality Date   • CATARACT EXTRACTION     •  SECTION     • COLONOSCOPY  2013   • ENDOMETRIAL ABLATION       The patient has a family history of  Family History   Problem Relation Age of Onset   • Hypertension Mother    • Hyperlipidemia Mother    • Hypothyroidism Mother    • Hypertension Father    • Hyperlipidemia Father    • Osteoarthritis Father      Social History  "    Socioeconomic History   • Marital status:      Spouse name: Not on file   • Number of children: Not on file   • Years of education: Not on file   • Highest education level: Not on file   Tobacco Use   • Smoking status: Never Smoker   • Smokeless tobacco: Never Used   Substance and Sexual Activity   • Alcohol use: No   • Drug use: No   • Sexual activity: Yes       Review of Systems   Constitutional: Negative for activity change, appetite change, chills and fever.   HENT: Positive for congestion, postnasal drip and sinus pressure. Negative for ear pain, sore throat and swollen glands.    Respiratory: Negative for cough, choking, chest tightness, shortness of breath, wheezing and stridor.    Cardiovascular: Negative for chest pain.   Skin: Negative for dry skin and rash.   Allergic/Immunologic: Positive for environmental allergies.     There were no vitals taken for this visit.    Current Outpatient Medications:   •  amoxicillin-clavulanate (Augmentin) 875-125 MG per tablet, Take 1 tablet by mouth 2 (Two) Times a Day., Disp: 20 tablet, Rfl: 0  •  calcium carbonate (OS-CAT) 600 MG tablet, Take 600 mg by mouth Daily., Disp: , Rfl:   •  cholecalciferol (VITAMIN D3) 25 MCG (1000 UT) tablet, Take 1,000 Units by mouth Daily., Disp: , Rfl:   •  CONTOUR NEXT TEST test strip, EST BLOOD SUGAR SIX TIMES D, Disp: , Rfl:   •  Insulin Syringe-Needle U-100 (B-D INSULIN SYRINGE) 31G X 5/16\" 0.3 ML misc, BD SAFETYGLIDE INSULIN SYRINGE 31G X 5/16\" 0.3 ML, Disp: , Rfl:   •  levothyroxine (SYNTHROID, LEVOTHROID) 25 MCG tablet, Take 25 mcg by mouth Daily., Disp: , Rfl:   •  NOVOLOG 100 UNIT/ML injection, INJECT 100 UNITS INTO THE SKIN QD, Disp: , Rfl:   •  rosuvastatin (CRESTOR) 10 MG tablet, Take 10 mg by mouth Daily., Disp: , Rfl: 5  •  tobramycin (Tobrex) 0.3 % solution ophthalmic solution, Administer 1 drop to both eyes Every 4 (Four) Hours., Disp: 5 mL, Rfl: 0    Objective   Physical Exam  Constitutional:       General: " She is not in acute distress.     Appearance: Normal appearance. She is well-developed. She is not ill-appearing or diaphoretic.      Comments: Patient is in no distress, patient has normal voice and speech.  Normal respiratory effort.   HENT:      Head: Normocephalic and atraumatic.      Comments: Some congestion noted.  Pulmonary:      Effort: Pulmonary effort is normal.   Musculoskeletal:      Cervical back: Normal range of motion and neck supple.   Neurological:      General: No focal deficit present.      Mental Status: She is alert and oriented to person, place, and time. Mental status is at baseline.   Psychiatric:         Mood and Affect: Mood normal.         Assessment/Plan   Diagnoses and all orders for this visit:    1. Subacute maxillary sinusitis (Primary)  -     amoxicillin-clavulanate (Augmentin) 875-125 MG per tablet; Take 1 tablet by mouth 2 (Two) Times a Day.  Dispense: 20 tablet; Refill: 0      I will be starting her on Augmentin. She was also advised to continue her Flonase. She is to monitor her symptoms and call us back if no improvement.          Return if symptoms worsen or fail to improve, for Recheck.    Requested Prescriptions     Signed Prescriptions Disp Refills   • amoxicillin-clavulanate (Augmentin) 875-125 MG per tablet 20 tablet 0     Sig: Take 1 tablet by mouth 2 (Two) Times a Day.

## 2021-06-20 DIAGNOSIS — Z12.31 VISIT FOR SCREENING MAMMOGRAM: Primary | ICD-10-CM

## 2021-07-26 ENCOUNTER — HOSPITAL ENCOUNTER (OUTPATIENT)
Dept: MAMMOGRAPHY | Facility: HOSPITAL | Age: 61
Discharge: HOME | End: 2021-07-26
Admitting: FAMILY MEDICINE

## 2021-07-26 DIAGNOSIS — Z12.31 VISIT FOR SCREENING MAMMOGRAM: ICD-10-CM

## 2021-07-26 PROCEDURE — 77067 SCR MAMMO BI INCL CAD: CPT

## 2021-07-26 PROCEDURE — 77063 BREAST TOMOSYNTHESIS BI: CPT

## 2021-11-12 ENCOUNTER — OFFICE VISIT (OUTPATIENT)
Dept: FAMILY MEDICINE CLINIC | Facility: CLINIC | Age: 61
End: 2021-11-12

## 2021-11-12 ENCOUNTER — LAB (OUTPATIENT)
Dept: FAMILY MEDICINE CLINIC | Facility: CLINIC | Age: 61
End: 2021-11-12

## 2021-11-12 VITALS
DIASTOLIC BLOOD PRESSURE: 79 MMHG | HEIGHT: 65 IN | HEART RATE: 51 BPM | SYSTOLIC BLOOD PRESSURE: 134 MMHG | BODY MASS INDEX: 36.19 KG/M2 | TEMPERATURE: 97.7 F | OXYGEN SATURATION: 95 % | WEIGHT: 217.2 LBS | RESPIRATION RATE: 16 BRPM

## 2021-11-12 DIAGNOSIS — Z12.11 COLON CANCER SCREENING: ICD-10-CM

## 2021-11-12 DIAGNOSIS — Z23 NEED FOR 23-POLYVALENT PNEUMOCOCCAL POLYSACCHARIDE VACCINE: ICD-10-CM

## 2021-11-12 DIAGNOSIS — Z01.419 ENCOUNTER FOR GYNECOLOGICAL EXAMINATION WITHOUT ABNORMAL FINDING: Primary | ICD-10-CM

## 2021-11-12 LAB
DEVELOPER EXPIRATION DATE: NORMAL
DEVELOPER LOT NUMBER: NORMAL
EXPIRATION DATE: NORMAL
FECAL OCCULT BLOOD SCREEN, POC: NEGATIVE
Lab: NORMAL
NEGATIVE CONTROL: NEGATIVE
POSITIVE CONTROL: POSITIVE

## 2021-11-12 PROCEDURE — 82270 OCCULT BLOOD FECES: CPT | Performed by: FAMILY MEDICINE

## 2021-11-12 PROCEDURE — 85025 COMPLETE CBC W/AUTO DIFF WBC: CPT | Performed by: FAMILY MEDICINE

## 2021-11-12 PROCEDURE — 99396 PREV VISIT EST AGE 40-64: CPT | Performed by: FAMILY MEDICINE

## 2021-11-12 PROCEDURE — 36415 COLL VENOUS BLD VENIPUNCTURE: CPT | Performed by: FAMILY MEDICINE

## 2021-11-12 PROCEDURE — 90732 PPSV23 VACC 2 YRS+ SUBQ/IM: CPT | Performed by: FAMILY MEDICINE

## 2021-11-12 PROCEDURE — 86803 HEPATITIS C AB TEST: CPT | Performed by: FAMILY MEDICINE

## 2021-11-12 PROCEDURE — 90471 IMMUNIZATION ADMIN: CPT | Performed by: FAMILY MEDICINE

## 2021-11-12 NOTE — PROGRESS NOTES
Subjective   Chief Complaint   Patient presents with   • Annual Exam     Pap   • Gynecologic Exam     Ron Caba is a 61 y.o. female.     Patient Care Team:  Hailey Rae MD as PCP - General  Bhavana Turner PA (Physician Assistant)    She is coming in today for her annual wellness exam and gynecological exam and Pap smear.  She reports doing well and she denies any new issues or concerns.  She is followed by endocrinology office on regular basis due to her type 1 diabetes and she is on insulin pump.  She gets blood work done through them on regular basis and she just recently had blood work done couple of months ago.  She went through the menopause at the age of 50.  She denies any abnormal vaginal bleeding or vaginal discharge.  She is not sexually active.  She denies any breast concerns. Patient does not report any chest pain, shortness of breath, dizziness, nausea, vomiting, or diarrhea, visual issues, headaches, numbness or tingling. No urinary issues reported like urgency, frequency, or discomfort upon urination.  No significant weight changes reported.  No swelling reported.  No rashes or any other skin issues reported. No emotional issues or insomnia.         The following portions of the patient's history were reviewed and updated as appropriate: allergies, current medications, past family history, past medical history, past social history, past surgical history and problem list.  Past Medical History:   Diagnosis Date   • Allergic    • Arthritis    • Breast cyst    • Cataract    • Diabetes type 1, uncontrolled (HCC)    • Fibrocystic breast    • Hyperlipidemia    • Hypothyroidism    • Osteopenia    • Vitamin D deficiency      Past Surgical History:   Procedure Laterality Date   • CATARACT EXTRACTION     •  SECTION     • COLONOSCOPY  2013   • ENDOMETRIAL ABLATION       The patient has a family history of  Family History   Problem Relation Age of Onset   •  Hypertension Mother    • Hyperlipidemia Mother    • Hypothyroidism Mother    • Hypertension Father    • Hyperlipidemia Father    • Osteoarthritis Father      Social History     Socioeconomic History   • Marital status:    Tobacco Use   • Smoking status: Never Smoker   • Smokeless tobacco: Never Used   Substance and Sexual Activity   • Alcohol use: No   • Drug use: No   • Sexual activity: Yes       Review of Systems   Constitutional: Negative for activity change, appetite change, chills, fatigue, fever, unexpected weight gain and unexpected weight loss.   HENT: Negative for congestion, ear pain, hearing loss, nosebleeds, postnasal drip, swollen glands, tinnitus and trouble swallowing.    Eyes: Negative for blurred vision, double vision and visual disturbance.   Respiratory: Negative for cough, choking, chest tightness, shortness of breath, wheezing and stridor.    Cardiovascular: Negative for chest pain, palpitations and leg swelling.   Gastrointestinal: Negative for abdominal distention, abdominal pain, anal bleeding, constipation, diarrhea, nausea, vomiting and GERD.   Endocrine: Negative for cold intolerance, heat intolerance and polyphagia.   Genitourinary: Negative for breast discharge, breast lump, breast pain, dysuria, flank pain, frequency, hematuria, pelvic pain, urgency and vaginal pain.   Musculoskeletal: Negative for arthralgias, back pain, gait problem, joint swelling and neck pain.   Skin: Negative for color change, dry skin and skin lesions.   Allergic/Immunologic: Negative for environmental allergies and food allergies.   Neurological: Negative for dizziness, tremors, speech difficulty, light-headedness, numbness, headache and confusion.   Hematological: Negative for adenopathy. Does not bruise/bleed easily.   Psychiatric/Behavioral: Negative for decreased concentration, sleep disturbance, depressed mood and stress.     Visit Vitals  /79 (BP Location: Left arm, Patient Position: Sitting,  "Cuff Size: Adult)   Pulse 51   Temp 97.7 °F (36.5 °C)   Resp 16   Ht 165.1 cm (65\")   Wt 98.5 kg (217 lb 3.2 oz)   SpO2 95%   BMI 36.14 kg/m²       Current Outpatient Medications:   •  calcium carbonate (OS-CAT) 600 MG tablet, Take 600 mg by mouth Daily., Disp: , Rfl:   •  cholecalciferol (VITAMIN D3) 25 MCG (1000 UT) tablet, Take 1,000 Units by mouth Daily., Disp: , Rfl:   •  CONTOUR NEXT TEST test strip, EST BLOOD SUGAR SIX TIMES D, Disp: , Rfl:   •  Insulin Syringe-Needle U-100 (B-D INSULIN SYRINGE) 31G X 5/16\" 0.3 ML misc, BD SAFETYGLIDE INSULIN SYRINGE 31G X 5/16\" 0.3 ML, Disp: , Rfl:   •  levothyroxine (SYNTHROID, LEVOTHROID) 25 MCG tablet, Take 25 mcg by mouth Daily., Disp: , Rfl:   •  NOVOLOG 100 UNIT/ML injection, INJECT 100 UNITS INTO THE SKIN QD, Disp: , Rfl:   •  rosuvastatin (CRESTOR) 10 MG tablet, Take 10 mg by mouth Daily., Disp: , Rfl: 5  No current facility-administered medications for this visit.    Objective   Physical Exam  Vitals and nursing note reviewed.   Constitutional:       General: She is not in acute distress.     Appearance: She is well-developed. She is not diaphoretic.   HENT:      Head: Normocephalic and atraumatic.      Right Ear: External ear normal.      Left Ear: External ear normal.   Eyes:      General: No scleral icterus.        Right eye: No discharge.         Left eye: No discharge.      Conjunctiva/sclera: Conjunctivae normal.      Pupils: Pupils are equal, round, and reactive to light.   Neck:      Thyroid: No thyromegaly.      Vascular: No JVD.   Cardiovascular:      Rate and Rhythm: Normal rate and regular rhythm.      Heart sounds: Normal heart sounds. No murmur heard.  No friction rub. No gallop.    Pulmonary:      Effort: Pulmonary effort is normal. No respiratory distress.      Breath sounds: Normal breath sounds. No stridor. No wheezing, rhonchi or rales.   Chest:   Breasts:      Right: No swelling, bleeding, inverted nipple, mass, nipple discharge, skin change or " tenderness.      Left: No swelling, bleeding, inverted nipple, mass, nipple discharge, skin change or tenderness.       Abdominal:      General: Bowel sounds are normal. There is no distension.      Palpations: Abdomen is soft. There is no mass.      Tenderness: There is no abdominal tenderness. There is no guarding or rebound.      Hernia: No hernia is present.   Genitourinary:     Vagina: No signs of injury and foreign body. No vaginal discharge, erythema, tenderness, bleeding, lesions or prolapsed vaginal walls.      Cervix: No cervical motion tenderness, discharge, friability, lesion, erythema, cervical bleeding or eversion.      Uterus: Not deviated, not enlarged, not fixed, not tender and no uterine prolapse.       Adnexa:         Right: No mass, tenderness or fullness.          Left: No mass, tenderness or fullness.        Rectum: Guaiac result negative. No mass, tenderness, anal fissure, external hemorrhoid or internal hemorrhoid. Normal anal tone.   Musculoskeletal:         General: No swelling, tenderness or deformity. Normal range of motion.      Cervical back: Normal range of motion and neck supple. No muscular tenderness.      Right lower leg: No edema.      Left lower leg: No edema.   Lymphadenopathy:      Cervical: No cervical adenopathy.   Skin:     General: Skin is warm and dry.      Capillary Refill: Capillary refill takes less than 2 seconds.      Coloration: Skin is not pale.      Findings: No bruising, erythema, lesion or rash.   Neurological:      General: No focal deficit present.      Mental Status: She is alert and oriented to person, place, and time.      Cranial Nerves: No cranial nerve deficit.      Sensory: No sensory deficit.      Motor: No weakness.      Coordination: Coordination normal.      Deep Tendon Reflexes: Reflexes normal.   Psychiatric:         Mood and Affect: Mood normal.         Behavior: Behavior normal.         Judgment: Judgment normal.         Office Visit on  11/12/2021   Component Date Value Ref Range Status   • Fecal Occult Blood 11/12/2021 Negative  Negative Final   • Lot Number 11/12/2021 [id number]   Final   • Expiration Date 11/12/2021 01/31/2023   Final   • DEVELOPER LOT NUMBER 11/12/2021 [id number]   Final   • DEVELOPER EXPIRATION DATE 11/12/2021 01/31/2023   Final   • Positive Control 11/12/2021 Positive  Positive Final   • Negative Control 11/12/2021 Negative  Negative Final         Assessment/Plan   Diagnoses and all orders for this visit:    1. Encounter for gynecological examination without abnormal finding (Primary)  -     Hepatitis C Antibody  -     CBC Auto Differential  -     IGP,Aptima HPV,Age Gdln; Future  -     IGP,Aptima HPV,Age Gdln    2. Colon cancer screening  -     POC Occult Blood Stool    3. Need for 23-polyvalent pneumococcal polysaccharide vaccine  -     pneumococcal polysaccharide 23-valent (PNEUMOVAX-23) vaccine 0.5 mL      Wellness exam with Pap smear was done today - see above for details. Healthy life style was reviewed and discussed and re-enforced. Regular exercise and healthy diet were also discussed and recommended.  I get her 6 months blood work done through the endocrinology office.  I will be getting some additional labs today.  I reviewed her health maintenance.  Her last mammogram was in 7/2021.  Her last colonoscopy was in 4/2013 and 10-year follow-up was recommended.  She reports to be up to speed with her diabetic eye exam.  Immunization was also reviewed.  She is fully vaccinated against COVID-19 and she already had her booster shot.  She also already had her flu shot.  She was given Pneumovax today.  She already completed her Shingrix vaccination series.      Return in about 1 year (around 11/12/2022) for Annual physical.    Requested Prescriptions      No prescriptions requested or ordered in this encounter     Answers for HPI/ROS submitted by the patient on 11/5/2021  What is the primary reason for your visit?: Physical

## 2021-11-13 LAB
BASOPHILS # BLD AUTO: 0.05 10*3/MM3 (ref 0–0.2)
BASOPHILS NFR BLD AUTO: 0.5 % (ref 0–1.5)
DEPRECATED RDW RBC AUTO: 40.6 FL (ref 37–54)
EOSINOPHIL # BLD AUTO: 0.45 10*3/MM3 (ref 0–0.4)
EOSINOPHIL NFR BLD AUTO: 4.5 % (ref 0.3–6.2)
ERYTHROCYTE [DISTWIDTH] IN BLOOD BY AUTOMATED COUNT: 13.1 % (ref 12.3–15.4)
HCT VFR BLD AUTO: 42.5 % (ref 34–46.6)
HCV AB SER DONR QL: NORMAL
HGB BLD-MCNC: 13.9 G/DL (ref 12–15.9)
IMM GRANULOCYTES # BLD AUTO: 0.03 10*3/MM3 (ref 0–0.05)
IMM GRANULOCYTES NFR BLD AUTO: 0.3 % (ref 0–0.5)
LYMPHOCYTES # BLD AUTO: 2.4 10*3/MM3 (ref 0.7–3.1)
LYMPHOCYTES NFR BLD AUTO: 24 % (ref 19.6–45.3)
MCH RBC QN AUTO: 28.1 PG (ref 26.6–33)
MCHC RBC AUTO-ENTMCNC: 32.7 G/DL (ref 31.5–35.7)
MCV RBC AUTO: 85.9 FL (ref 79–97)
MONOCYTES # BLD AUTO: 0.7 10*3/MM3 (ref 0.1–0.9)
MONOCYTES NFR BLD AUTO: 7 % (ref 5–12)
NEUTROPHILS NFR BLD AUTO: 6.37 10*3/MM3 (ref 1.7–7)
NEUTROPHILS NFR BLD AUTO: 63.7 % (ref 42.7–76)
NRBC BLD AUTO-RTO: 0 /100 WBC (ref 0–0.2)
PLATELET # BLD AUTO: 290 10*3/MM3 (ref 140–450)
PMV BLD AUTO: 10.4 FL (ref 6–12)
RBC # BLD AUTO: 4.95 10*6/MM3 (ref 3.77–5.28)
WBC # BLD AUTO: 10 10*3/MM3 (ref 3.4–10.8)

## 2021-11-15 ENCOUNTER — TELEPHONE (OUTPATIENT)
Dept: FAMILY MEDICINE CLINIC | Facility: CLINIC | Age: 61
End: 2021-11-15

## 2021-11-15 LAB — HBA1C MFR BLD: 8.36 %

## 2021-11-16 ENCOUNTER — TELEPHONE (OUTPATIENT)
Dept: FAMILY MEDICINE CLINIC | Facility: CLINIC | Age: 61
End: 2021-11-16

## 2021-11-18 LAB
AGE GDLN ACOG TESTING: NORMAL
CYTOLOGIST CVX/VAG CYTO: NORMAL
CYTOLOGY CVX/VAG DOC CYTO: NORMAL
CYTOLOGY CVX/VAG DOC THIN PREP: NORMAL
DX ICD CODE: NORMAL
HIV 1 & 2 AB SER-IMP: NORMAL
HPV I/H RISK 4 DNA CVX QL PROBE+SIG AMP: NEGATIVE
OTHER STN SPEC: NORMAL
STAT OF ADQ CVX/VAG CYTO-IMP: NORMAL

## 2021-11-18 RX ORDER — FLUCONAZOLE 150 MG/1
150 TABLET ORAL ONCE
Qty: 1 TABLET | Refills: 0 | Status: SHIPPED | OUTPATIENT
Start: 2021-11-18 | End: 2021-11-18

## 2022-04-04 ENCOUNTER — TELEPHONE (OUTPATIENT)
Dept: FAMILY MEDICINE CLINIC | Facility: CLINIC | Age: 62
End: 2022-04-04

## 2022-04-04 NOTE — TELEPHONE ENCOUNTER
SHE IS ON VACATION IN [place] AND WANTS TO KNOW IF YOU CAN CALL HER IN SOME ANTIBIOTICS FOR A SINUS INFECTION. SHE WOULD LIKE THIS SENT TO [facility] @ [phone]  [address] [place] [id number]

## 2022-04-05 RX ORDER — AMOXICILLIN 875 MG/1
875 TABLET, COATED ORAL 2 TIMES DAILY
Qty: 20 TABLET | Refills: 0 | Status: SHIPPED | OUTPATIENT
Start: 2022-04-05 | End: 2022-11-23

## 2022-04-05 NOTE — TELEPHONE ENCOUNTER
I need more information to know whether patient even need antibiotic.  Most sinus infections to begin with are viral in nature and do not need antibiotic right away.  How long she has had symptoms?  What symptoms does she have?  Any fever?  Thank you.

## 2022-04-05 NOTE — TELEPHONE ENCOUNTER
Patient states she has had symptoms since Sunday. She tried OTC claritin with little relief. She has sinus pressure / Pressure in her cheeks and above her eyebrows. SHE DENIES fever or cough. When she blows her nose she has green drainage.

## 2022-06-28 DIAGNOSIS — Z12.31 VISIT FOR SCREENING MAMMOGRAM: Primary | ICD-10-CM

## 2022-08-02 LAB — HBA1C MFR BLD: 8.6 %

## 2022-08-03 ENCOUNTER — HOSPITAL ENCOUNTER (OUTPATIENT)
Dept: MAMMOGRAPHY | Facility: HOSPITAL | Age: 62
Discharge: HOME | End: 2022-08-03
Admitting: FAMILY MEDICINE

## 2022-08-03 DIAGNOSIS — Z12.31 VISIT FOR SCREENING MAMMOGRAM: ICD-10-CM

## 2022-08-03 PROCEDURE — 77063 BREAST TOMOSYNTHESIS BI: CPT

## 2022-08-03 PROCEDURE — 77067 SCR MAMMO BI INCL CAD: CPT

## 2022-11-09 ENCOUNTER — TELEPHONE (OUTPATIENT)
Dept: FAMILY MEDICINE CLINIC | Facility: CLINIC | Age: 62
End: 2022-11-09
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
## 2022-11-10 NOTE — TELEPHONE ENCOUNTER
I called the patient and she stated that she bought otc solarcaine with aloe and it has helped/ I offered an appt and she can't come in tomorrow/ and there is nothing available today. She said she will see how the otc works

## 2022-11-23 ENCOUNTER — OFFICE VISIT (OUTPATIENT)
Dept: FAMILY MEDICINE CLINIC | Facility: CLINIC | Age: 62
End: 2022-11-23

## 2022-11-23 VITALS
BODY MASS INDEX: 35.56 KG/M2 | OXYGEN SATURATION: 96 % | SYSTOLIC BLOOD PRESSURE: 133 MMHG | RESPIRATION RATE: 16 BRPM | TEMPERATURE: 98.2 F | WEIGHT: 213.4 LBS | DIASTOLIC BLOOD PRESSURE: 79 MMHG | HEART RATE: 55 BPM | HEIGHT: 65 IN

## 2022-11-23 DIAGNOSIS — Z12.11 COLON CANCER SCREENING: ICD-10-CM

## 2022-11-23 DIAGNOSIS — Z00.00 PREVENTATIVE HEALTH CARE: Primary | ICD-10-CM

## 2022-11-23 PROCEDURE — 99396 PREV VISIT EST AGE 40-64: CPT | Performed by: FAMILY MEDICINE

## 2022-11-23 RX ORDER — CHLORAL HYDRATE 500 MG
1000 CAPSULE ORAL
COMMUNITY

## 2022-11-23 NOTE — PROGRESS NOTES
Subjective   Chief Complaint   Patient presents with   • Annual Exam     Ron Caba is a 62 y.o. female.     Patient Care Team:  Hailey Rae MD as PCP - General  Bhavaan Turner PA (Physician Assistant)    History of Present Illness  She is coming in today for her annual wellness exam.  She reports doing well and she denies any new issues or concerns.  She is being followed by endocrinology office for the management of type 1 diabetes.  She is on insulin pump.  She also was in the recent past started on a small dose of levothyroxine due to low thyroid function.  She works full-time, she is a . Patient does not report any chest pain, shortness of breath, dizziness, nausea, vomiting, or diarrhea, visual issues, headaches, numbness or tingling. No urinary issues reported like urgency, frequency, or discomfort upon urination.  No significant weight changes reported.  No swelling reported.  No rashes or any other skin issues reported. No emotional issues or insomnia.       The following portions of the patient's history were reviewed and updated as appropriate: allergies, current medications, past family history, past medical history, past social history, past surgical history and problem list.  Past Medical History:   Diagnosis Date   • Allergic    • Arthritis    • Asthma In chart   • Breast cyst    • Cataract    • Diabetes type 1, uncontrolled    • Fibrocystic breast    • Hyperlipidemia    • Hypothyroidism    • Osteopenia    • Urinary tract infection In chart   • Vitamin D deficiency      Past Surgical History:   Procedure Laterality Date   • CATARACT EXTRACTION     •  SECTION     • COLONOSCOPY  2013   • ENDOMETRIAL ABLATION       The patient has a family history of  Family History   Problem Relation Age of Onset   • Hypertension Mother    • Hyperlipidemia Mother    • Hypothyroidism Mother    • Hypertension Father    • Hyperlipidemia Father    •  "Osteoarthritis Father    • Diabetes Brother      Social History     Socioeconomic History   • Marital status:    Tobacco Use   • Smoking status: Never   • Smokeless tobacco: Never   Substance and Sexual Activity   • Alcohol use: No   • Drug use: No   • Sexual activity: Not Currently     Partners: Male     Birth control/protection: None       Review of Systems   Constitutional: Negative for activity change, appetite change, chills, fatigue, fever, unexpected weight gain and unexpected weight loss.   HENT: Negative for congestion, ear pain, hearing loss, nosebleeds, postnasal drip, swollen glands, tinnitus and trouble swallowing.    Eyes: Negative for blurred vision, double vision and visual disturbance.   Respiratory: Negative for cough, choking, chest tightness, shortness of breath, wheezing and stridor.    Cardiovascular: Negative for chest pain, palpitations and leg swelling.   Gastrointestinal: Negative for abdominal distention, abdominal pain, anal bleeding, constipation, diarrhea, nausea, vomiting and GERD.   Endocrine: Negative for cold intolerance, heat intolerance and polyphagia.   Genitourinary: Negative for dysuria, flank pain, frequency, hematuria and urgency.   Musculoskeletal: Negative for arthralgias, back pain, gait problem, joint swelling and neck pain.   Skin: Negative for color change, dry skin and skin lesions.   Allergic/Immunologic: Negative for environmental allergies and food allergies.   Neurological: Negative for dizziness, tremors, speech difficulty, light-headedness, numbness, headache and confusion.   Hematological: Negative for adenopathy. Does not bruise/bleed easily.   Psychiatric/Behavioral: Negative for decreased concentration, sleep disturbance, depressed mood and stress.     Visit Vitals  /79 (BP Location: Left arm, Patient Position: Sitting, Cuff Size: Adult)   Pulse 55   Temp 98.2 °F (36.8 °C) (Temporal)   Resp 16   Ht 165.1 cm (65\")   Wt 96.8 kg (213 lb 6.4 oz) " "  SpO2 96%   BMI 35.51 kg/m²       Current Outpatient Medications:   •  calcium carbonate (OS-CAT) 600 MG tablet, Take 600 mg by mouth Daily., Disp: , Rfl:   •  cholecalciferol (VITAMIN D3) 25 MCG (1000 UT) tablet, Take 1,000 Units by mouth Daily., Disp: , Rfl:   •  CONTOUR NEXT TEST test strip, EST BLOOD SUGAR SIX TIMES D, Disp: , Rfl:   •  Insulin Syringe-Needle U-100 31G X 5/16\" 0.3 ML misc, BD SAFETYGLIDE INSULIN SYRINGE 31G X 5/16\" 0.3 ML, Disp: , Rfl:   •  levothyroxine (SYNTHROID, LEVOTHROID) 25 MCG tablet, Take 25 mcg by mouth Daily., Disp: , Rfl:   •  NOVOLOG 100 UNIT/ML injection, INJECT 100 UNITS INTO THE SKIN QD, Disp: , Rfl:   •  Omega-3 Fatty Acids (fish oil) 1000 MG capsule capsule, Take 1,000 mg by mouth Daily With Breakfast., Disp: , Rfl:   •  rosuvastatin (CRESTOR) 10 MG tablet, Take 10 mg by mouth Daily., Disp: , Rfl: 5    Objective   Physical Exam  Vitals and nursing note reviewed.   Constitutional:       General: She is not in acute distress.     Appearance: She is well-developed. She is not diaphoretic.   HENT:      Head: Normocephalic and atraumatic.      Right Ear: External ear normal.      Left Ear: External ear normal.   Eyes:      General: No scleral icterus.        Right eye: No discharge.         Left eye: No discharge.      Conjunctiva/sclera: Conjunctivae normal.      Pupils: Pupils are equal, round, and reactive to light.   Neck:      Thyroid: No thyromegaly.      Vascular: No JVD.   Cardiovascular:      Rate and Rhythm: Normal rate and regular rhythm.      Heart sounds: Normal heart sounds. No murmur heard.    No friction rub. No gallop.   Pulmonary:      Effort: Pulmonary effort is normal. No respiratory distress.      Breath sounds: Normal breath sounds. No stridor. No wheezing, rhonchi or rales.   Abdominal:      General: Bowel sounds are normal. There is no distension.      Palpations: Abdomen is soft. There is no mass.      Tenderness: There is no abdominal tenderness. There is " no guarding or rebound.      Hernia: No hernia is present.   Musculoskeletal:         General: No swelling, tenderness or deformity. Normal range of motion.      Cervical back: Normal range of motion and neck supple. No muscular tenderness.      Right lower leg: No edema.      Left lower leg: No edema.   Lymphadenopathy:      Cervical: No cervical adenopathy.   Skin:     General: Skin is warm and dry.      Capillary Refill: Capillary refill takes less than 2 seconds.      Coloration: Skin is not pale.      Findings: No bruising, erythema, lesion or rash.   Neurological:      General: No focal deficit present.      Mental Status: She is alert and oriented to person, place, and time.      Cranial Nerves: No cranial nerve deficit.      Sensory: No sensory deficit.      Motor: No weakness.      Coordination: Coordination normal.      Deep Tendon Reflexes: Reflexes normal.   Psychiatric:         Mood and Affect: Mood normal.         Behavior: Behavior normal.         Judgment: Judgment normal.           Assessment & Plan   Diagnoses and all orders for this visit:    1. Preventative health care (Primary)    2. Colon cancer screening  -     Ambulatory Referral For Screening Colonoscopy      Wellness exam was done today - see above for details. Healthy life style was reviewed and discussed and re-enforced. Regular exercise and healthy diet were also discussed and recommended.  I reviewed her recent fasting blood work results done through the endocrinology office.  I reviewed her health maintenance.  Her last mammogram was in 8/2022.  Her last Pap smear was in 11/2021.  Her last colonoscopy was in 4/2013 and 10-year follow-up was recommended.  Referral was given and patient will be scheduling that down the road.  I also reviewed her vaccination.  She is vaccinated and booster against COVID-19.  She also completed her Shingrix vaccination series and she is up to speed with her pneumonia shot.  She already received her flu  shot.        Return in about 1 year (around 11/23/2023) for Annual physical.    Requested Prescriptions      No prescriptions requested or ordered in this encounter

## 2022-12-16 ENCOUNTER — TELEPHONE (OUTPATIENT)
Dept: FAMILY MEDICINE CLINIC | Facility: CLINIC | Age: 62
End: 2022-12-16

## 2023-02-14 ENCOUNTER — HOSPITAL ENCOUNTER (INPATIENT)
Facility: HOSPITAL | Age: 63
LOS: 1 days | Discharge: HOME | DRG: 177 | End: 2023-02-16
Attending: EMERGENCY MEDICINE | Admitting: STUDENT IN AN ORGANIZED HEALTH CARE EDUCATION/TRAINING PROGRAM
Payer: COMMERCIAL

## 2023-02-14 ENCOUNTER — APPOINTMENT (OUTPATIENT)
Dept: GENERAL RADIOLOGY | Facility: HOSPITAL | Age: 63
DRG: 177 | End: 2023-02-14
Payer: COMMERCIAL

## 2023-02-14 DIAGNOSIS — R06.00 DYSPNEA, UNSPECIFIED TYPE: Primary | ICD-10-CM

## 2023-02-14 DIAGNOSIS — U07.1 COVID-19: ICD-10-CM

## 2023-02-14 DIAGNOSIS — R79.89 POSITIVE D DIMER: ICD-10-CM

## 2023-02-14 DIAGNOSIS — R09.02 HYPOXIA: ICD-10-CM

## 2023-02-14 LAB
ARTERIAL PATENCY WRIST A: POSITIVE
ATMOSPHERIC PRESS: ABNORMAL MM[HG]
BASE EXCESS BLDA CALC-SCNC: -0.2 MMOL/L (ref 0–3)
BASOPHILS # BLD AUTO: 0.1 10*3/MM3 (ref 0–0.2)
BASOPHILS NFR BLD AUTO: 0.5 % (ref 0–1.5)
BDY SITE: ABNORMAL
CO2 BLDA-SCNC: 25.3 MMOL/L (ref 22–29)
DEPRECATED RDW RBC AUTO: 46.4 FL (ref 37–54)
EOSINOPHIL # BLD AUTO: 0.3 10*3/MM3 (ref 0–0.4)
EOSINOPHIL NFR BLD AUTO: 2 % (ref 0.3–6.2)
ERYTHROCYTE [DISTWIDTH] IN BLOOD BY AUTOMATED COUNT: 14.9 % (ref 12.3–15.4)
HCO3 BLDA-SCNC: 24.2 MMOL/L (ref 21–28)
HCT VFR BLD AUTO: 41.6 % (ref 34–46.6)
HEMODILUTION: NO
HGB BLD-MCNC: 13.8 G/DL (ref 12–15.9)
INHALED O2 CONCENTRATION: 36 %
LYMPHOCYTES # BLD AUTO: 1.5 10*3/MM3 (ref 0.7–3.1)
LYMPHOCYTES NFR BLD AUTO: 10.6 % (ref 19.6–45.3)
MCH RBC QN AUTO: 27.9 PG (ref 26.6–33)
MCHC RBC AUTO-ENTMCNC: 33.2 G/DL (ref 31.5–35.7)
MCV RBC AUTO: 84.1 FL (ref 79–97)
MODALITY: ABNORMAL
MONOCYTES # BLD AUTO: 0.8 10*3/MM3 (ref 0.1–0.9)
MONOCYTES NFR BLD AUTO: 5.9 % (ref 5–12)
NEUTROPHILS NFR BLD AUTO: 11.2 10*3/MM3 (ref 1.7–7)
NEUTROPHILS NFR BLD AUTO: 81 % (ref 42.7–76)
NRBC BLD AUTO-RTO: 0.1 /100 WBC (ref 0–0.2)
NT-PROBNP SERPL-MCNC: 60.3 PG/ML (ref 0–900)
PCO2 BLDA: 37.9 MM HG (ref 35–48)
PH BLDA: 7.41 PH UNITS (ref 7.35–7.45)
PLATELET # BLD AUTO: 250 10*3/MM3 (ref 140–450)
PMV BLD AUTO: 7.5 FL (ref 6–12)
PO2 BLDA: 72.1 MM HG (ref 83–108)
RBC # BLD AUTO: 4.95 10*6/MM3 (ref 3.77–5.28)
SAO2 % BLDCOA: 94.5 % (ref 94–98)
WBC NRBC COR # BLD: 13.9 10*3/MM3 (ref 3.4–10.8)

## 2023-02-14 PROCEDURE — 80053 COMPREHEN METABOLIC PANEL: CPT | Performed by: NURSE PRACTITIONER

## 2023-02-14 PROCEDURE — 85025 COMPLETE CBC W/AUTO DIFF WBC: CPT | Performed by: NURSE PRACTITIONER

## 2023-02-14 PROCEDURE — 99285 EMERGENCY DEPT VISIT HI MDM: CPT

## 2023-02-14 PROCEDURE — 83880 ASSAY OF NATRIURETIC PEPTIDE: CPT | Performed by: NURSE PRACTITIONER

## 2023-02-14 PROCEDURE — 84484 ASSAY OF TROPONIN QUANT: CPT | Performed by: NURSE PRACTITIONER

## 2023-02-14 PROCEDURE — 71045 X-RAY EXAM CHEST 1 VIEW: CPT

## 2023-02-14 PROCEDURE — 93005 ELECTROCARDIOGRAM TRACING: CPT | Performed by: EMERGENCY MEDICINE

## 2023-02-14 PROCEDURE — 36600 WITHDRAWAL OF ARTERIAL BLOOD: CPT

## 2023-02-14 PROCEDURE — 93005 ELECTROCARDIOGRAM TRACING: CPT

## 2023-02-14 PROCEDURE — 82803 BLOOD GASES ANY COMBINATION: CPT

## 2023-02-14 RX ORDER — SODIUM CHLORIDE 0.9 % (FLUSH) 0.9 %
10 SYRINGE (ML) INJECTION AS NEEDED
Status: DISCONTINUED | OUTPATIENT
Start: 2023-02-14 | End: 2023-02-16 | Stop reason: HOSPADM

## 2023-02-15 ENCOUNTER — APPOINTMENT (OUTPATIENT)
Dept: CT IMAGING | Facility: HOSPITAL | Age: 63
DRG: 177 | End: 2023-02-15
Payer: COMMERCIAL

## 2023-02-15 PROBLEM — J96.01 ACUTE HYPOXEMIC RESPIRATORY FAILURE DUE TO COVID-19 (HCC): Status: ACTIVE | Noted: 2023-02-15

## 2023-02-15 PROBLEM — U07.1 ACUTE HYPOXEMIC RESPIRATORY FAILURE DUE TO COVID-19: Status: ACTIVE | Noted: 2023-02-15

## 2023-02-15 PROBLEM — R79.89 ELEVATED D-DIMER: Status: ACTIVE | Noted: 2023-02-15

## 2023-02-15 LAB
ALBUMIN SERPL-MCNC: 4.1 G/DL (ref 3.5–5.2)
ALBUMIN SERPL-MCNC: 4.3 G/DL (ref 3.5–5.2)
ALBUMIN/GLOB SERPL: 1.2 G/DL
ALP SERPL-CCNC: 102 U/L (ref 39–117)
ALP SERPL-CCNC: 93 U/L (ref 39–117)
ALT SERPL W P-5'-P-CCNC: 17 U/L (ref 1–33)
ALT SERPL W P-5'-P-CCNC: 19 U/L (ref 1–33)
ANION GAP SERPL CALCULATED.3IONS-SCNC: 13 MMOL/L (ref 5–15)
AST SERPL-CCNC: 20 U/L (ref 1–32)
AST SERPL-CCNC: 23 U/L (ref 1–32)
BILIRUB CONJ SERPL-MCNC: 0.2 MG/DL (ref 0–0.3)
BILIRUB INDIRECT SERPL-MCNC: 0.6 MG/DL
BILIRUB SERPL-MCNC: 0.7 MG/DL (ref 0–1.2)
BILIRUB SERPL-MCNC: 0.8 MG/DL (ref 0–1.2)
BUN SERPL-MCNC: 12 MG/DL (ref 8–23)
BUN/CREAT SERPL: 13.2 (ref 7–25)
CALCIUM SPEC-SCNC: 9.4 MG/DL (ref 8.6–10.5)
CHLORIDE SERPL-SCNC: 102 MMOL/L (ref 98–107)
CO2 SERPL-SCNC: 24 MMOL/L (ref 22–29)
CREAT SERPL-MCNC: 0.77 MG/DL (ref 0.57–1)
CREAT SERPL-MCNC: 0.91 MG/DL (ref 0.57–1)
D DIMER PPP FEU-MCNC: 1.72 MG/L (FEU) (ref 0–0.63)
EGFRCR SERPLBLD CKD-EPI 2021: 71 ML/MIN/1.73
EGFRCR SERPLBLD CKD-EPI 2021: 86.8 ML/MIN/1.73
GEN 5 2HR TROPONIN T REFLEX: 7 NG/L
GLOBULIN UR ELPH-MCNC: 3.5 GM/DL
GLUCOSE SERPL-MCNC: 221 MG/DL (ref 65–99)
HOLD SPECIMEN: NORMAL
HOLD SPECIMEN: NORMAL
POTASSIUM SERPL-SCNC: 3.9 MMOL/L (ref 3.5–5.2)
PROT SERPL-MCNC: 7.4 G/DL (ref 6–8.5)
PROT SERPL-MCNC: 7.6 G/DL (ref 6–8.5)
QT INTERVAL: 299 MS
SODIUM SERPL-SCNC: 139 MMOL/L (ref 136–145)
TROPONIN T DELTA: NORMAL
TROPONIN T SERPL HS-MCNC: <6 NG/L

## 2023-02-15 PROCEDURE — XW033E5 INTRODUCTION OF REMDESIVIR ANTI-INFECTIVE INTO PERIPHERAL VEIN, PERCUTANEOUS APPROACH, NEW TECHNOLOGY GROUP 5: ICD-10-PCS | Performed by: HOSPITALIST

## 2023-02-15 PROCEDURE — 71275 CT ANGIOGRAPHY CHEST: CPT

## 2023-02-15 PROCEDURE — 63710000001 DEXAMETHASONE PER 0.25 MG: Performed by: NURSE PRACTITIONER

## 2023-02-15 PROCEDURE — 85379 FIBRIN DEGRADATION QUANT: CPT | Performed by: NURSE PRACTITIONER

## 2023-02-15 PROCEDURE — 82565 ASSAY OF CREATININE: CPT | Performed by: NURSE PRACTITIONER

## 2023-02-15 PROCEDURE — 0 IOPAMIDOL PER 1 ML: Performed by: HOSPITALIST

## 2023-02-15 PROCEDURE — 94799 UNLISTED PULMONARY SVC/PX: CPT

## 2023-02-15 PROCEDURE — 80076 HEPATIC FUNCTION PANEL: CPT | Performed by: NURSE PRACTITIONER

## 2023-02-15 PROCEDURE — 94640 AIRWAY INHALATION TREATMENT: CPT

## 2023-02-15 PROCEDURE — 36415 COLL VENOUS BLD VENIPUNCTURE: CPT | Performed by: NURSE PRACTITIONER

## 2023-02-15 PROCEDURE — 84484 ASSAY OF TROPONIN QUANT: CPT | Performed by: NURSE PRACTITIONER

## 2023-02-15 PROCEDURE — 25010000002 ENOXAPARIN PER 10 MG: Performed by: NURSE PRACTITIONER

## 2023-02-15 PROCEDURE — 25010000002 ENOXAPARIN PER 10 MG: Performed by: HOSPITALIST

## 2023-02-15 PROCEDURE — 25010000002 REMDESIVIR 100 MG RECONSTITUTED SOLUTION: Performed by: NURSE PRACTITIONER

## 2023-02-15 RX ORDER — ONDANSETRON 2 MG/ML
4 INJECTION INTRAMUSCULAR; INTRAVENOUS EVERY 6 HOURS PRN
Status: DISCONTINUED | OUTPATIENT
Start: 2023-02-15 | End: 2023-02-16 | Stop reason: HOSPADM

## 2023-02-15 RX ORDER — ACETAMINOPHEN 325 MG/1
650 TABLET ORAL EVERY 4 HOURS PRN
Status: DISCONTINUED | OUTPATIENT
Start: 2023-02-15 | End: 2023-02-16 | Stop reason: HOSPADM

## 2023-02-15 RX ORDER — ENOXAPARIN SODIUM 100 MG/ML
1 INJECTION SUBCUTANEOUS 2 TIMES DAILY
Status: DISCONTINUED | OUTPATIENT
Start: 2023-02-15 | End: 2023-02-15

## 2023-02-15 RX ORDER — ENOXAPARIN SODIUM 100 MG/ML
40 INJECTION SUBCUTANEOUS DAILY
Status: DISCONTINUED | OUTPATIENT
Start: 2023-02-15 | End: 2023-02-15

## 2023-02-15 RX ORDER — DEXAMETHASONE 4 MG/1
6 TABLET ORAL DAILY
Status: DISCONTINUED | OUTPATIENT
Start: 2023-02-15 | End: 2023-02-16 | Stop reason: HOSPADM

## 2023-02-15 RX ORDER — DEXTROSE MONOHYDRATE 25 G/50ML
25 INJECTION, SOLUTION INTRAVENOUS
Status: DISCONTINUED | OUTPATIENT
Start: 2023-02-15 | End: 2023-02-16 | Stop reason: HOSPADM

## 2023-02-15 RX ORDER — ENOXAPARIN SODIUM 100 MG/ML
40 INJECTION SUBCUTANEOUS EVERY 24 HOURS
Status: DISCONTINUED | OUTPATIENT
Start: 2023-02-16 | End: 2023-02-16 | Stop reason: HOSPADM

## 2023-02-15 RX ORDER — ALBUTEROL SULFATE 90 UG/1
2 AEROSOL, METERED RESPIRATORY (INHALATION) EVERY 6 HOURS PRN
Status: DISCONTINUED | OUTPATIENT
Start: 2023-02-15 | End: 2023-02-16 | Stop reason: HOSPADM

## 2023-02-15 RX ORDER — NICOTINE POLACRILEX 4 MG
15 LOZENGE BUCCAL
Status: DISCONTINUED | OUTPATIENT
Start: 2023-02-15 | End: 2023-02-16 | Stop reason: HOSPADM

## 2023-02-15 RX ORDER — LEVOTHYROXINE SODIUM 0.03 MG/1
25 TABLET ORAL
Status: DISCONTINUED | OUTPATIENT
Start: 2023-02-15 | End: 2023-02-16 | Stop reason: HOSPADM

## 2023-02-15 RX ORDER — DEXAMETHASONE SODIUM PHOSPHATE 4 MG/ML
6 INJECTION, SOLUTION INTRA-ARTICULAR; INTRALESIONAL; INTRAMUSCULAR; INTRAVENOUS; SOFT TISSUE DAILY
Status: DISCONTINUED | OUTPATIENT
Start: 2023-02-15 | End: 2023-02-16 | Stop reason: HOSPADM

## 2023-02-15 RX ORDER — ACETAMINOPHEN 650 MG/1
650 SUPPOSITORY RECTAL EVERY 4 HOURS PRN
Status: DISCONTINUED | OUTPATIENT
Start: 2023-02-15 | End: 2023-02-16 | Stop reason: HOSPADM

## 2023-02-15 RX ORDER — ALBUTEROL SULFATE 90 UG/1
2 AEROSOL, METERED RESPIRATORY (INHALATION)
Status: DISCONTINUED | OUTPATIENT
Start: 2023-02-15 | End: 2023-02-16 | Stop reason: HOSPADM

## 2023-02-15 RX ORDER — ROSUVASTATIN CALCIUM 10 MG/1
20 TABLET, COATED ORAL NIGHTLY
Status: DISCONTINUED | OUTPATIENT
Start: 2023-02-15 | End: 2023-02-16 | Stop reason: HOSPADM

## 2023-02-15 RX ORDER — GUAIFENESIN/DEXTROMETHORPHAN 100-10MG/5
20 SYRUP ORAL EVERY 4 HOURS PRN
Status: DISCONTINUED | OUTPATIENT
Start: 2023-02-15 | End: 2023-02-16 | Stop reason: HOSPADM

## 2023-02-15 RX ORDER — BUDESONIDE AND FORMOTEROL FUMARATE DIHYDRATE 160; 4.5 UG/1; UG/1
2 AEROSOL RESPIRATORY (INHALATION)
Status: DISCONTINUED | OUTPATIENT
Start: 2023-02-15 | End: 2023-02-16 | Stop reason: HOSPADM

## 2023-02-15 RX ORDER — OLANZAPINE 10 MG/2ML
1 INJECTION, POWDER, LYOPHILIZED, FOR SOLUTION INTRAMUSCULAR
Status: DISCONTINUED | OUTPATIENT
Start: 2023-02-15 | End: 2023-02-16 | Stop reason: HOSPADM

## 2023-02-15 RX ADMIN — REMDESIVIR 200 MG: 100 INJECTION, POWDER, LYOPHILIZED, FOR SOLUTION INTRAVENOUS at 04:59

## 2023-02-15 RX ADMIN — DEXAMETHASONE 6 MG: 4 TABLET ORAL at 04:23

## 2023-02-15 RX ADMIN — IOPAMIDOL 100 ML: 755 INJECTION, SOLUTION INTRAVENOUS at 11:40

## 2023-02-15 RX ADMIN — GUAIFENESIN SYRUP AND DEXTROMETHORPHAN 20 ML: 100; 10 SYRUP ORAL at 05:06

## 2023-02-15 RX ADMIN — ENOXAPARIN SODIUM 100 MG: 100 INJECTION SUBCUTANEOUS at 09:40

## 2023-02-15 RX ADMIN — GUAIFENESIN SYRUP AND DEXTROMETHORPHAN 20 ML: 100; 10 SYRUP ORAL at 21:11

## 2023-02-15 RX ADMIN — BUDESONIDE AND FORMOTEROL FUMARATE DIHYDRATE 2 PUFF: 160; 4.5 AEROSOL RESPIRATORY (INHALATION) at 20:36

## 2023-02-15 RX ADMIN — LEVOTHYROXINE SODIUM 25 MCG: 0.03 TABLET ORAL at 09:40

## 2023-02-15 RX ADMIN — ROSUVASTATIN 20 MG: 10 TABLET, FILM COATED ORAL at 20:04

## 2023-02-15 RX ADMIN — ENOXAPARIN SODIUM 40 MG: 100 INJECTION SUBCUTANEOUS at 04:24

## 2023-02-15 RX ADMIN — ALBUTEROL SULFATE 2 PUFF: 108 INHALANT RESPIRATORY (INHALATION) at 20:35

## 2023-02-15 RX ADMIN — ALBUTEROL SULFATE 2 PUFF: 108 INHALANT RESPIRATORY (INHALATION) at 15:15

## 2023-02-15 NOTE — ED NOTES
Lab states that all the green top redraws have been hemolyzed. Last recollect nurse sent down three separate green top tubes. Lab states all three were not good. Nurse requested lab to come collect. ER provider notified

## 2023-02-15 NOTE — ED NOTES
Pt ambulates in the room without 02, 02 sats down to 87% and noted a little out of breath. Hooked back to 02 at 4lpm nasal cannula.

## 2023-02-15 NOTE — CONSULTS
Diabetes Education    Patient Name:  Ron Caba  YOB: 1960  MRN: [id number]  Admit Date:  2/14/2023        MD consult for insulin pump. Appropriate PPE worn for COVID positive patient.  Insulin Pump Contract completed and in chart: Yes  Insulin Pump Log at bedside:  Yes  LDA started: Yes  Patient Supplied Insulin Pump (orders initiated): Yes  Educator Consult entered: Yes    Pump Brand/Model: Medtronic  CGMS Brand/Model: Guardian    Type of Insulin Used: Novolog    Basal Rate: (units/hour) 12am - 1.50 U/H                                                  5:30am - 1.35 U/H                                                  8:30am - 1.60 U/H                                                 10:30am - 0.950 U/H                                                 4:30pm - 1.20 U/H                                                 6pm - 1.60 U/H                                                 11pm - 1.50 U/H                                         Bolus Rate: (insulin:carbohydrate ratio) 12am - 1:8                                                                           11:30pm - 1:9.6    Last Bolus Given:                                                                  Insulin Sensitivity Factor/Correction Dose: 1:70     Blood Glucose Target:     Active Insulin: 2 hours    Date of Last Site Change: 2/14/2023    Location of Catheter: left hip    Site Assessment: clean, dry, and intact    Educator assessed:       pump supplies at bedside: yes       Insulin Expiration Date: Novolog 8/31/2024    Asked patient about her insulin to carb ratio change at 11:30pm and patient stated she just changed pumps and probably transferred it over wrong. Patient stated it is supposed to always be 1:8. Changed settings in pump for insulin carb ratio to be 1:8 for 24 hours. Explained to patient that she was on steroids and that could cause her to have higher blood sugars. Patient stated she understood and has been on steroids  before. Patient has no further questions or concerns related to diabetes at this time.              Electronically signed by:  Criss Power RN  02/15/23 11:31 EST

## 2023-02-15 NOTE — PAYOR COMM NOTE
"INPATIENT ORDER 2/15/23    ER ADMIT FROM URGENT CARE CENTER WITH HYPOXIA AND POSITIVE COVID TEST. PATIENT ON 4L SUPPLEMENTAL OXYGEN. ELEVATED WBC COUNT AND D.DIMER.    IV REMDESIVIR, STEROID AND NEBULIZER. PULMONARY CONSULT PENDING    ==========  AUTHORIZATION PENDING:   PLEASE FAX OR CALL DETERMINATION TO CONTACT BELOW:       THANK YOU,    SHLETON Lovell, RN  Utilization Review  [facility]  Phone: [phone]  Fax: [phone]      NPI: [id number]  Tax ID: [id number]          Regan Caba (63 y.o. Female)     Date of Birth   1960    Social Security Number       Address   [address] [place] [address] [place] [address]    Home Phone   [phone]    MRN   [id number]       Hindu   Jewish    Marital Status                               Admission Date   2/14/23    Admission Type   Emergency    Admitting Provider   Avila Garcia DO    Attending Provider   Helga Henry MD    Department, Room/Bed   [facility] 2C MEDICAL INPATIENT, 250/1       Discharge Date       Discharge Disposition       Discharge Destination                               Attending Provider: Helga Henry MD    Allergies: Procaine    Isolation: Enh Drop/Con   Infection: COVID (confirmed) (02/15/23)   Code Status: CPR    Ht: 165.1 cm (65\")   Wt: 97.7 kg (215 lb 6.2 oz)    Admission Cmt: None   Principal Problem: Acute hypoxemic respiratory failure due to COVID-19 ([facility]) [U07.1,J96.01]                 Active Insurance as of 2/14/2023     Primary Coverage     Payor Plan Insurance Group Employer/Plan Group    [employer] [facility] [employer] EMPLOYEE [id number]     Payor Plan Address Payor Plan Phone Number Payor Plan Fax Number Effective Dates    [id number] [phone]  1/1/2015 - None Entered    [facility] [id number]       Subscriber Name Subscriber Birth Date Member ID       REGAN CABA 1960 [id number]                 Emergency Contacts      (Rel.) Home Phone " Work Phone Mobile Phone    ANGEL CABA JR (Son) [phone] -- [phone]    SAVANNA IRWIN (Father) [phone] -- [phone]               History & Physical      Yaritza Mcmahon APRN at 02/15/23 0250     Attestation signed by Avila Garcia DO at 02/15/23 0601    Documentation reviewed, agree with plan of NP supervised by physician.  There were no concerns on patient care and management made known by the NP to supervising physician.                        [facility] Medicine Services  History & Physical    Patient Name: Ron Caba  : 1960  MRN: [id number]  Primary Care Physician:  Hailey Rae MD  Date of admission: 2023  Date and Time of Service: 02/15/2023 at 0300    Subjective       Chief Complaint: shortness of breath, cough    History of Present Illness: Ron Caba is a 63 y.o. female with PMH of type 1 diabetes, hypothyroidism, hypercholesterolemia, vitamin D deficiency who [facility] to [facility]23 with complaints of shortness of breath and cough that started on 2023. She denied any fever, no nausea or vomiting, no chest pain. She has never had covid-19 in the past. She is vaccinated. She is a . She tested positive for COVID-19 at a [facility] (results reviewed in care everywhere) and was sent to the ER due to hypoxia. She denied any history of blood clots.     In the ED the patient was hypoxic and placed on 4 L O2 via nasal cannula.  ABG showed pH 7.41, PCO2 37.9, PO2 72.1, HCO3 24.2 on 36% FiO2.  CMP showed glucose of 221.  WBC 13.9, D-dimer elevated at 1.72.  Normal troponin.  Chest x-ray showed subtle bibasilar airspace disease versus artifact from overlying soft tissue.  Patient had temperature of 99.  Unable to gain IV access in the ED therefore CT PE protocol still pending.  She was admitted for further treatment of acute hypoxic respiratory failure secondary to COVID-19.  Pulmonary embolus has  "not yet been ruled out.      Review of Systems   Constitutional: Negative.   HENT: Positive for hoarse voice.    Eyes: Negative.    Cardiovascular: Negative.    Respiratory: Positive for cough and shortness of breath.    Endocrine: Negative.    Hematologic/Lymphatic: Negative.    Skin: Negative.    Musculoskeletal: Negative.    Gastrointestinal: Negative.    Genitourinary: Negative.    Neurological: Negative.    Psychiatric/Behavioral: Negative.    Allergic/Immunologic: Negative.    All other systems reviewed and are negative.       Personal History     Past Medical History:   Diagnosis Date   • Allergic    • Arthritis    • Asthma In chart   • Breast cyst    • Cataract    • Diabetes type 1, uncontrolled    • Fibrocystic breast    • Hyperlipidemia    • Hypothyroidism    • Osteopenia    • Urinary tract infection In chart   • Vitamin D deficiency        Past Surgical History:   Procedure Laterality Date   • CATARACT EXTRACTION     •  SECTION     • COLONOSCOPY  2013   • ENDOMETRIAL ABLATION         Family History: family history includes Diabetes in her brother; Hyperlipidemia in her father and mother; Hypertension in her father and mother; Hypothyroidism in her mother; Osteoarthritis in her father.     Social History:  reports that she has never smoked. She has never used smokeless tobacco. She reports that she does not drink alcohol and does not use drugs.    Home Medications:  Prior to Admission Medications     Prescriptions Last Dose Informant Patient Reported? Taking?    calcium carbonate (OS-CAT) 600 MG tablet   Yes No    Take 600 mg by mouth Daily.    cholecalciferol (VITAMIN D3) 25 MCG (1000 UT) tablet   Yes No    Take 1,000 Units by mouth Daily.    CONTOUR NEXT TEST test strip   Yes No    EST BLOOD SUGAR SIX TIMES D    Insulin Syringe-Needle U-100 31G X 5/16\" 0.3 ML misc   Yes No    BD SAFETYGLIDE INSULIN SYRINGE 31G X 5/16\" 0.3 ML    levothyroxine (SYNTHROID, LEVOTHROID) 25 MCG " tablet   Yes No    Take 25 mcg by mouth Daily.    NOVOLOG 100 UNIT/ML injection   Yes No    INJECT 100 UNITS INTO THE SKIN QD    Omega-3 Fatty Acids (fish oil) 1000 MG capsule capsule  Self Yes No    Take 1,000 mg by mouth Daily With Breakfast.    rosuvastatin (CRESTOR) 10 MG tablet   Yes No    Take 10 mg by mouth Daily.            Allergies:  Allergies   Allergen Reactions   • Procaine Unknown (See Comments)     novocaine-as child        Objective       Vitals:   Temp:  [99 °F (37.2 °C)] 99 °F (37.2 °C)  Heart Rate:  [64-92] 76  Resp:  [18] 18  BP: (115-136)/(51-70) 132/62  Flow (L/min):  [4] 4    Physical Exam  Vitals and nursing note reviewed.   HENT:      Head: Normocephalic and atraumatic.   Eyes:      Extraocular Movements: Extraocular movements intact.      Pupils: Pupils are equal, round, and reactive to light.   Cardiovascular:      Rate and Rhythm: Normal rate and regular rhythm.      Pulses: Normal pulses.      Heart sounds: Normal heart sounds.   Pulmonary:      Effort: Pulmonary effort is normal.      Breath sounds: Examination of the right-lower field reveals decreased breath sounds. Examination of the left-lower field reveals decreased breath sounds. Decreased breath sounds present.   Abdominal:      General: Bowel sounds are normal.      Palpations: Abdomen is soft.      Tenderness: There is no abdominal tenderness.   Musculoskeletal:         General: Normal range of motion.   Skin:     General: Skin is warm and dry.   Neurological:      Mental Status: She is alert and oriented to person, place, and time.   Psychiatric:         Mood and Affect: Mood normal.         Behavior: Behavior normal.          Result Review    Result Review:  I have personally reviewed the results from the time of this admission to 2/15/2023 04:04 EST and agree with these findings:  [x]  Laboratory  []  Microbiology  [x]  Radiology  []  EKG/Telemetry   []  Cardiology/Vascular   []  Pathology  [x]  Old records      Assessment &  Plan        Active Hospital Problems:  Active Hospital Problems    Diagnosis    • Acute hypoxemic respiratory failure due to COVID-19 (HCC)    • Elevated d-dimer    • Hypothyroidism (acquired)    • Hyperlipidemia    • Type 1 diabetes mellitus with diabetic neuropathy (HCC)      Plan:     Acute hypoxemic respiratory failure with hypoxia secondary to Covid-19  -symptoms onset 2/13/2023  -positive Covid-19 swab on 2/14/2023 at [facility]  -hypoxic 87% on room air, placed on 4L O2 via NC  -ABG: pH 7.41, PCO2 37.9, PO2 72.1, HCO3 24.2 on 36% FiO2  -CXR:  subtle bibasilar airspace disease versus artifact from overlying soft tissue  -D-dimer elevated at 1.72  -WBC 13.9, temperature 99  -CT PE protocol pending, pt needs IV access due to multiple failed attempts. IV team consult  -consult pharmacy for remdesivir, albuterol inhaler prn, dexamethasone, lovenox  -continuous pulse oximetry     Elevated d-dimer  -d-dimer 1.72 as above  -CT PE protocol pending r/o PE  -VTE lovenox ordered and may need to be adjusted depending on CT results    DM type 1  -pt has insulin pump and will continue to manage her pump    Hypothyroidism  -resume home synthroid when verified     Hypercholesterolemia  -cont home statin when verified       DVT prophylaxis:  Medical DVT prophylaxis orders are present.    CODE STATUS:    Level Of Support Discussed With: Patient  Code Status (Patient has no pulse and is not breathing): CPR (Attempt to Resuscitate)  Medical Interventions (Patient has pulse or is breathing): Full Support    Admission Status:  I believe this patient meets inpatient status.    I discussed the patient's findings and my recommendations with patient.    This patient has been examined wearing appropriate Personal Protective Equipment. 02/15/23      Signature: Electronically signed by ALTAGRACIA Perales, 02/15/23, 04:04 EST.    Electronically signed by Avila Garcia DO at 02/15/23 0601          Emergency  Department Notes      Therese Spring, APRN at 23 2239          Subjective   History of Present Illness  Patient presents with:  Shortness of Breath    Hailey Rae MD   No LMP recorded. Patient is postmenopausal.  Patient is a 63-year-old female presents the ED with a complaint of shortness of breath, cough.  Patient reports that she was seen at the immediate care center today, she was found to have COVID-19.  Patient does not normally wear oxygen.  She was noted to be hypoxic at the urgent care and referred to the emergency department.  Patient does report has a history of diabetes, she denies any history of COPD or asthma.        Review of Systems   Constitutional: Negative for chills and fever.   Respiratory: Positive for cough, chest tightness (With coughing) and shortness of breath.    Cardiovascular: Negative for chest pain, palpitations and leg swelling.   Gastrointestinal: Negative for abdominal pain.   Musculoskeletal: Negative for back pain and neck pain.   Skin: Negative for color change and rash.   Neurological: Negative for dizziness, syncope, weakness and light-headedness.       Past Medical History:   Diagnosis Date   • Allergic    • Arthritis    • Asthma In chart   • Breast cyst    • Cataract    • Diabetes type 1, uncontrolled    • Fibrocystic breast    • Hyperlipidemia    • Hypothyroidism    • Osteopenia    • Urinary tract infection In chart   • Vitamin D deficiency        Allergies   Allergen Reactions   • Procaine Unknown (See Comments)     novocaine-as child        Past Surgical History:   Procedure Laterality Date   • CATARACT EXTRACTION     •  SECTION     • COLONOSCOPY  2013   • ENDOMETRIAL ABLATION         Family History   Problem Relation Age of Onset   • Hypertension Mother    • Hyperlipidemia Mother    • Hypothyroidism Mother    • Hypertension Father    • Hyperlipidemia Father    • Osteoarthritis Father    • Diabetes Brother        Social  "History     Socioeconomic History   • Marital status:    Tobacco Use   • Smoking status: Never   • Smokeless tobacco: Never   Substance and Sexual Activity   • Alcohol use: No   • Drug use: No   • Sexual activity: Not Currently     Partners: Male     Birth control/protection: None           Objective   Physical Exam  Vitals and nursing note reviewed.   Constitutional:       Appearance: She is well-developed. She is ill-appearing.   HENT:      Head: Normocephalic and atraumatic.      Mouth/Throat:      Mouth: Mucous membranes are moist.      Pharynx: Oropharynx is clear.   Eyes:      Extraocular Movements: Extraocular movements intact.      Pupils: Pupils are equal, round, and reactive to light.   Cardiovascular:      Rate and Rhythm: Normal rate and regular rhythm.   Pulmonary:      Effort: Pulmonary effort is normal.      Breath sounds: Normal breath sounds.   Musculoskeletal:      Cervical back: Normal range of motion and neck supple.      Right lower leg: No tenderness. No edema.      Left lower leg: No tenderness. No edema.   Skin:     General: Skin is warm and dry.   Neurological:      General: No focal deficit present.      Mental Status: She is alert and oriented to person, place, and time.         Procedures          ED Course  ED Course as of 02/15/23 0327   Wed Feb 15, 2023   0246 [facility] staff has been unable to obtain an appropriate IV for patient's CTA. [LB]   0250 Discussed with ALTAGRACIA Vigil [LB]      ED Course User Index  [LB] Therese Spring APRN      /61   Pulse 64   Temp 99 °F (37.2 °C) (Oral)   Resp 18   Ht 165.1 cm (65\")   Wt 97.7 kg (215 lb 6.2 oz)   SpO2 93%   BMI 35.84 kg/m²   Labs Reviewed   COMPREHENSIVE METABOLIC PANEL - Abnormal; Notable for the following components:       Result Value    Glucose 221 (*)     All other components within normal limits    Narrative:     GFR Normal >60  Chronic Kidney Disease <60  Kidney Failure <15     CBC WITH AUTO DIFFERENTIAL " "- Abnormal; Notable for the following components:    WBC 13.90 (*)     Neutrophil % 81.0 (*)     Lymphocyte % 10.6 (*)     Neutrophils, Absolute 11.20 (*)     All other components within normal limits   BLOOD GAS, ARTERIAL - Abnormal; Notable for the following components:    pO2, Arterial 72.1 (*)     Base Excess, Arterial -0.2 (*)     All other components within normal limits   D-DIMER, QUANTITATIVE - Abnormal; Notable for the following components:    D-Dimer, Quantitative 1.72 (*)     All other components within normal limits    Narrative:     According to the assay 's published package insert, a normal (<0.50 mg/L (FEU)) D-dimer result in conjunction with a non-high clinical probability assessment, excludes deep vein thrombosis (DVT) and pulmonary embolism (PE) with high sensitivity.    D-dimer values increase with age and this can make VTE exclusion of an older population difficult. To address this, the American College of Physicians, based on best available evidence and recent guidelines, recommends that clinicians use age-adjusted D-dimer thresholds in patients greater than 50 years of age with: a) a low probability of PE who do not meet all Pulmonary Embolism Rule Out Criteria, or b) in those with intermediate probability of PE.   The formula for an age-adjusted D-dimer cut-off is \"age/100\".  For example, a 60 year old patient would have an age-adjusted cut-off of 0.60 mg/L (FEU) and an 80 year old 0.80 mg/L (FEU).   BNP (IN-HOUSE) - Normal    Narrative:     Among patients with dyspnea, NT-proBNP is highly sensitive for the detection of acute congestive heart failure. In addition NT-proBNP of <300 pg/ml effectively rules out acute congestive heart failure with 99% negative predictive value.     TROPONIN - Normal    Narrative:     High Sensitive Troponin T Reference Range:  <10.0 ng/L- Negative Female for AMI  <15.0 ng/L- Negative Male for AMI  >=10 - Abnormal Female indicating possible myocardial " injury.  >=15 - Abnormal Male indicating possible myocardial injury.   Clinicians would have to utilize clinical acumen, EKG, Troponin, and serial changes to determine if it is an Acute Myocardial Infarction or myocardial injury due to an underlying chronic condition.        BLOOD GAS, ARTERIAL   HIGH SENSITIVITIY TROPONIN T 2HR   CBC AND DIFFERENTIAL    Narrative:     The following orders were created for panel order CBC & Differential.  Procedure                               Abnormality         Status                     ---------                               -----------         ------                     CBC Auto Differential[[id number]]        Abnormal            Final result                 Please view results for these tests on the individual orders.   EXTRA TUBES    Narrative:     The following orders were created for panel order Extra Tubes.  Procedure                               Abnormality         Status                     ---------                               -----------         ------                     Green Top (Gel)[[id number]]                                  Final result               Green Top (Gel)[[id number]]                                  Final result                 Please view results for these tests on the individual orders.   GREEN TOP   GREEN TOP     Medications   sodium chloride 0.9 % flush 10 mL (has no administration in time range)     XR Chest 1 View   Final Result   Impression:   Subtle bibasilar airspace disease versus artifact from overlying soft tissue.      Electronically Signed: Alexander Carrillo     2/14/2023 10:09 PM EST     Workstation ID: [id number]      CT Angiogram Chest Pulmonary Embolism    (Results Pending)                                Sinus rhythm rate 91, compared to previous 1/12/2015.  No acute ST change.  Corroborated with ED attending physician, Dr. Jj.          Medical Decision Making  Amount and/or Complexity of Data Reviewed  Labs: ordered.  Radiology:  ordered.  ECG/medicine tests: ordered.      Risk  Prescription drug management.      Record review: COVID-positive at the [facility].  Reviewed [facility] note from today patient noted to have hypoxemia at the urgent care.  Patient was brought back to the emergency department room for evaluation and placed on appropriate monitoring.  Patient underwent the above exam and work-up for dyspnea and COVID-19.  Differential diagnoses considered, PE, pneumonia.  Chest x-ray reveals subtle bibasilar airspace disease versus atelectasis.  Patient is known COVID-positive.  Consultation was placed to the hospitalist, I spoke with Ernestina, we agree on admission for hypoxia, O2 requirement which is new for the patient.  Tests/studies considered but not performed: CT chest PE protocol was ordered, however patient has been stuck numerous times for IV access for the CTA, given that patient will be admitted, despite CT results, will hold off on CTA at this time, patient will continue admission to the hospitalist service, and expect she can have IV therapy evaluation in the morning for appropriate IV access.  Disposition : I discussed with the patient their test results, work-up here in the emergency department, and need for admission and further evaluation. Patient is agreeable to the plan of care. At time of disposition patients VS are reviewed, and patient without acute distress.  Opportunity was provided for questions at the bedside, all questions and concerns were addressed.  Note Disclaimer: At [facility], we believe that sharing information builds trust and better relationships. You are receiving this note because you recently visited [facility]. It is possible you will see health information before a provider has talked with you about it. This kind of information can be easy to misunderstand. To help you fully understand what it means for your health, we urge you to discuss this note with your provider.  Note dictated utilizing  Gale Dictation.   Appropriate PPE worn during patient interactions.      Final diagnoses:   Dyspnea, unspecified type   COVID-19   Positive D dimer   Hypoxia       ED Disposition  ED Disposition     ED Disposition   Decision to Admit    Condition   --    Comment   Level of Care: Telemetry [5]   Admitting Physician: AVILA MORROW [[id number]]   Attending Physician: AVILA MORROW [[id number]]               No follow-up provider specified.       Medication List      No changes were made to your prescriptions during this visit.          Therese Spring APRN  02/15/23 0327      Electronically signed by Therese Spring APRN at 02/15/23 0327     Janay Marcelo, RN at 02/15/23 0011        Pt ambulates in the room without 02, 02 sats down to 87% and noted a little out of breath. Hooked back to 02 at 4lpm nasal cannula.    Electronically signed by Janay Marcelo, RN at 02/15/23 0014     Marissa Thornton, RN at 02/15/23 0105        .      Electronically signed by Marissa Thornton, RN at 02/15/23 0111     Marissa Thornton RN at 02/15/23 0240        Lab states that all the green top redraws have been hemolyzed. Last recollect nurse sent down three separate green top tubes. Lab states all three were not good. Nurse requested lab to come collect. ER provider notified     Electronically signed by Marissa Thornton, RN at 02/15/23 0259     Janay Marcelo, RN at 02/15/23 0442          Nursing report ED to floor  Ron Caba  63 y.o.  female    HPI:   Chief Complaint   Patient presents with   • Shortness of Breath       Admitting doctor:   Avila Morrow DO    Admitting diagnosis:   The primary encounter diagnosis was Dyspnea, unspecified type. Diagnoses of COVID-19, Positive D dimer, and Hypoxia were also pertinent to this visit.    Code status:   Current Code Status       Date Active Code Status Order ID Comments User Context       2/15/2023 0350 CPR (Attempt to Resuscitate) [id number]  Yaritza Mcmahon APRN  ED        Question Answer    Code Status (Patient has no pulse and is not breathing) CPR (Attempt to Resuscitate)    Medical Interventions (Patient has pulse or is breathing) Full Support    Level Of Support Discussed With Patient                    Allergies:   Procaine    Isolation:  No active isolations     Fall Risk:  Fall Risk Assessment was completed, and patient is at moderate risk for falls.   Predictive Model Details         6 (Low) Factor Value    Calculated 2/15/2023 02:07 Age 63    Risk of Fall Model Musculoskeletal Assessment WDL     Active Peripheral IV Present     Imaging order in this encounter Present     Diastolic BP 51     Respiratory Rate 18     Skin Assessment WDL     Magnesium not on file     Drug Use No     Jaya Scale not on file     Financial Class Private Insurance     Peripheral Vascular Assessment WDL     Total Bilirubin 0.7 mg/dL     Gastrointestinal Assessment WDL     Chloride 102 mmol/L     Cardiac Assessment WDL     Albumin 4.1 g/dL     Creatinine 0.91 mg/dL     Days after Admission 0.193     Calcium 9.4 mg/dL     ALT 17 U/L     Potassium 3.9 mmol/L         Weight:       02/14/23 2103   Weight: 97.7 kg (215 lb 6.2 oz)       Intake and Output  No intake or output data in the 24 hours ending 02/15/23 0442    Diet:   Dietary Orders (From admission, onward)       Start     Ordered    02/15/23 0348  Diet: Diabetic Diets; Consistent Carbohydrate; Texture: Regular Texture (IDDSI 7); Fluid Consistency: Thin (IDDSI 0)  Diet Effective Now        References:    Diet Order Crosswalk   Question Answer Comment   Diets: Diabetic Diets    Diabetic Diet: Consistent Carbohydrate    Texture: Regular Texture (IDDSI 7)    Fluid Consistency: Thin (IDDSI 0)        02/15/23 0350                     Most recent vitals:   Vitals:    02/15/23 0001 02/15/23 0046 02/15/23 0231 02/15/23 0349   BP: 131/67 120/51 122/61 132/62   Pulse: 80 70 64 76   Resp: 18 18 18 18   Temp:       TempSrc:       SpO2: 93% 95% 93%  91%   Weight:       Height:           Active LDAs/IV Access:   Lines, Drains & Airways       Active LDAs       Name Placement date Placement time Site Days    Peripheral IV 02/14/23 2225 Posterior;Right Wrist 02/14/23 2225  Wrist  less than 1                    Skin Condition:   Skin Assessments (last day)       None             Labs (abnormal labs have a star):   Labs Reviewed   COMPREHENSIVE METABOLIC PANEL - Abnormal; Notable for the following components:       Result Value    Glucose 221 (*)     All other components within normal limits    Narrative:     GFR Normal >60  Chronic Kidney Disease <60  Kidney Failure <15     CBC WITH AUTO DIFFERENTIAL - Abnormal; Notable for the following components:    WBC 13.90 (*)     Neutrophil % 81.0 (*)     Lymphocyte % 10.6 (*)     Neutrophils, Absolute 11.20 (*)     All other components within normal limits   BLOOD GAS, ARTERIAL - Abnormal; Notable for the following components:    pO2, Arterial 72.1 (*)     Base Excess, Arterial -0.2 (*)     All other components within normal limits   D-DIMER, QUANTITATIVE - Abnormal; Notable for the following components:    D-Dimer, Quantitative 1.72 (*)     All other components within normal limits    Narrative:     According to the assay 's published package insert, a normal (<0.50 mg/L (FEU)) D-dimer result in conjunction with a non-high clinical probability assessment, excludes deep vein thrombosis (DVT) and pulmonary embolism (PE) with high sensitivity.    D-dimer values increase with age and this can make VTE exclusion of an older population difficult. To address this, the [employer], based on best available evidence and recent guidelines, recommends that clinicians use age-adjusted D-dimer thresholds in patients greater than 50 years of age with: a) a low probability of PE who do not meet all Pulmonary Embolism Rule Out Criteria, or b) in those with intermediate probability of PE.   The formula for an  "age-adjusted D-dimer cut-off is \"age/100\".  For example, a 60 year old patient would have an age-adjusted cut-off of 0.60 mg/L (FEU) and an 80 year old 0.80 mg/L (FEU).   BNP (IN-HOUSE) - Normal    Narrative:     Among patients with dyspnea, NT-proBNP is highly sensitive for the detection of acute congestive heart failure. In addition NT-proBNP of <300 pg/ml effectively rules out acute congestive heart failure with 99% negative predictive value.     TROPONIN - Normal    Narrative:     High Sensitive Troponin T Reference Range:  <10.0 ng/L- Negative Female for AMI  <15.0 ng/L- Negative Male for AMI  >=10 - Abnormal Female indicating possible myocardial injury.  >=15 - Abnormal Male indicating possible myocardial injury.   Clinicians would have to utilize clinical acumen, EKG, Troponin, and serial changes to determine if it is an Acute Myocardial Infarction or myocardial injury due to an underlying chronic condition.        BLOOD GAS, ARTERIAL   HIGH SENSITIVITIY TROPONIN T 2HR   HEPATIC FUNCTION PANEL   HEPATIC FUNCTION PANEL   CREATININE SERUM (KIDNEY FUNCTION) GFR COMPONENT   CREATININE SERUM (KIDNEY FUNCTION) GFR COMPONENT   CBC AND DIFFERENTIAL    Narrative:     The following orders were created for panel order CBC & Differential.  Procedure                               Abnormality         Status                     ---------                               -----------         ------                     CBC Auto Differential[[id number]]        Abnormal            Final result                 Please view results for these tests on the individual orders.   EXTRA TUBES    Narrative:     The following orders were created for panel order Extra Tubes.  Procedure                               Abnormality         Status                     ---------                               -----------         ------                     Green Top (Gel)[[id number]]                                  Final result               Green Top " (Gel)[[id number]]                                  Final result                 Please view results for these tests on the individual orders.   GREEN TOP   GREEN TOP       LOC: Person, Place, Time, and Situation    Telemetry:  Telemetry    Cardiac Monitoring Ordered: yes    EKG:   ECG 12 Lead Other; SOA   Preliminary Result   HEART RATE= 91  bpm   RR Interval= 660  ms   AL Interval= 135  ms   P Horizontal Axis= 3  deg   P Front Axis= 73  deg   QRSD Interval= 77  ms   QT Interval= 299  ms   QRS Axis= 47  deg   T Wave Axis= 71  deg   - ABNORMAL ECG -   Sinus rhythm   Low voltage, precordial leads   Nonspecific T abnrm, anterolateral leads   When compared with ECG of 12-Jan-2015 19:34:55,   Significant axis, voltage or hypertrophy change   Electronically Signed By:    Date and Time of Study: 2023-02-14 21:18:45          Medications Given in the ED:   Medications   sodium chloride 0.9 % flush 10 mL (has no administration in time range)   Enoxaparin Sodium (LOVENOX) syringe 40 mg (40 mg Subcutaneous Given 2/15/23 0424)   guaiFENesin-dextromethorphan (ROBITUSSIN DM) 100-10 MG/5ML syrup 20 mL (has no administration in time range)   albuterol sulfate HFA (PROVENTIL HFA;VENTOLIN HFA;PROAIR HFA) inhaler 2 puff (has no administration in time range)   dexamethasone (DECADRON) tablet 6 mg (6 mg Oral Given 2/15/23 0423)     Or   dexamethasone (DECADRON) injection 6 mg ( Intravenous Not Given:  See Alt 2/15/23 0423)   Pharmacy Consult - Remdesivir (has no administration in time range)   remdesivir 200 mg in 290 mL NS (has no administration in time range)     Followed by   remdesivir 100 mg in sodium chloride 0.9 % 250 mL IVPB (powder vial) (has no administration in time range)   ondansetron (ZOFRAN) injection 4 mg (has no administration in time range)       Imaging results:  XR Chest 1 View    Result Date: 2/14/2023  Impression: Subtle bibasilar airspace disease versus artifact from overlying soft tissue. Electronically Signed:  Alexander Carrillo  2/14/2023 10:09 PM EST  Workstation ID: [id number]     Social issues:   Social History     Socioeconomic History   • Marital status:    Tobacco Use   • Smoking status: Never   • Smokeless tobacco: Never   Substance and Sexual Activity   • Alcohol use: No   • Drug use: No   • Sexual activity: Not Currently     Partners: Male     Birth control/protection: None       NIH Stroke Scale:  Interval: (not recorded)  1a. Level of Consciousness: (not recorded)  1b. LOC Questions: (not recorded)  1c. LOC Commands: (not recorded)  2. Best Gaze: (not recorded)  3. Visual: (not recorded)  4. Facial Palsy: (not recorded)  5a. Motor Arm, Left: (not recorded)  5b. Motor Arm, Right: (not recorded)  6a. Motor Leg, Left: (not recorded)  6b. Motor Leg, Right: (not recorded)  7. Limb Ataxia: (not recorded)  8. Sensory: (not recorded)  9. Best Language: (not recorded)  10. Dysarthria: (not recorded)  11. Extinction and Inattention (formerly Neglect): (not recorded)    Total (NIH Stroke Scale): (not recorded)     Additional notable assessment information:     Nursing report ED to floor:    Janay Marcelo RN   02/15/23 04:42 EST      Electronically signed by Janay Marcelo RN at 02/15/23 0442       Vital Signs (last day)     Date/Time Temp Temp src Pulse Resp BP Patient Position SpO2    02/15/23 0851 97.6 (36.4) Oral 67 23 126/64 Lying 92    02/15/23 0602 -- -- 68 22 131/63 Lying 91    02/15/23 0446 -- -- 70 20 127/60 -- 93    02/15/23 0349 -- -- 76 18 132/62 -- 91    02/15/23 0231 -- -- 64 18 122/61 -- 93    02/15/23 0046 -- -- 70 18 120/51 -- 95    02/15/23 0001 -- -- 80 18 131/67 -- 93    02/14/23 2246 -- -- 78 18 115/66 -- 94    02/14/23 2106 99 (37.2) Oral -- -- -- -- --    02/14/23 2103 -- -- 92 18 136/70 -- 90          Oxygen Therapy (last day)     Date/Time SpO2 Device (Oxygen Therapy) Flow (L/min) Oxygen Concentration (%) ETCO2 (mmHg)    02/15/23 0851 92 nasal cannula 4 -- --    02/15/23 0602 91 nasal  cannula 4 -- --    02/15/23 0542 -- nasal cannula 4 -- --    02/15/23 0446 93 -- -- -- --    02/15/23 0349 91 -- -- -- --    02/15/23 0231 93 nasal cannula 4 -- --    02/15/23 0046 95 -- -- -- --    02/15/23 0001 93 -- -- -- --    02/14/23 2246 94 -- -- -- --    02/14/23 2103 90 room air -- -- --            Facility-Administered Medications as of 2/15/2023   Medication Dose Route Frequency Provider Last Rate Last Admin   • acetaminophen (TYLENOL) tablet 650 mg  650 mg Oral Q4H PRN Yaritza Mcmahon APRN        Or   • acetaminophen (TYLENOL) suppository 650 mg  650 mg Rectal Q4H PRN Yaritza Mcmahon APRN       • albuterol sulfate HFA (PROVENTIL HFA;VENTOLIN HFA;PROAIR HFA) inhaler 2 puff  2 puff Inhalation Q6H PRN Yaritza Mcmahon APRN       • albuterol sulfate HFA (PROVENTIL HFA;VENTOLIN HFA;PROAIR HFA) inhaler 2 puff  2 puff Inhalation 4x Daily - RT Shawna Marinelli APRN       • budesonide-formoterol (SYMBICORT) 160-4.5 MCG/ACT inhaler 2 puff  2 puff Inhalation BID - RT Shawna Marinelli APRN       • dexamethasone (DECADRON) tablet 6 mg  6 mg Oral Daily Yaritza Mcmahon APRN   6 mg at 02/15/23 0423    Or   • dexamethasone (DECADRON) injection 6 mg  6 mg Intravenous Daily Yaritza Mcmahon APRN       • dextrose (D50W) (25 g/50 mL) IV injection 25 g  25 g Intravenous Q15 Min PRN Helga Henry MD       • dextrose (GLUTOSE) oral gel 15 g  15 g Oral Q15 Min PRN Helga Henry MD       • Enoxaparin Sodium (LOVENOX) syringe 100 mg  1 mg/kg Subcutaneous BID Alexander Torres MD   100 mg at 02/15/23 0940   • glucagon (human recombinant) (GLUCAGEN DIAGNOSTIC) 1 mg in sterile water (preservative free) 1 mL injection  1 mg Intramuscular Q15 Min PRN Helga Henry MD       • guaiFENesin-dextromethorphan (ROBITUSSIN DM) 100-10 MG/5ML syrup 20 mL  20 mL Oral Q4H PRN Yaritza Mcmahon APRN   20 mL at 02/15/23 0503   • insulin patient supplied pump   Subcutaneous Continuous Helga Henry MD   3 Units  at 02/15/23 0942   • [COMPLETED] iopamidol (ISOVUE-370) 76 % injection 100 mL  100 mL Intravenous Once in imaging Helga Henry MD   100 mL at 02/15/23 1140   • levothyroxine (SYNTHROID, LEVOTHROID) tablet 25 mcg  25 mcg Oral Q AM Alexander Torres MD   25 mcg at 02/15/23 0940   • ondansetron (ZOFRAN) injection 4 mg  4 mg Intravenous Q6H PRN Yaritza Mcmahon APRN       • Pharmacy Consult - Remdesivir   Does not apply Continuous PRN Yaritza Mcmahon APRN       • [COMPLETED] remdesivir 200 mg in 290 mL NS  200 mg Intravenous Q24H Yaritza Mcmahon APRN   200 mg at 02/15/23 0459    Followed by   • [START ON 2/16/2023] remdesivir 100 mg in sodium chloride 0.9 % 250 mL IVPB (powder vial)  100 mg Intravenous Q24H Yaritza Mcmahon APRN       • rosuvastatin (CRESTOR) tablet 20 mg  20 mg Oral Nightly Alexander Torres MD       • sodium chloride 0.9 % flush 10 mL  10 mL Intravenous PRN Therese Spring APRN           Lab Results (last 24 hours)     Procedure Component Value Units Date/Time    High Sensitivity Troponin T 2Hr [[id number]] Collected: 02/15/23 0404    Specimen: Blood Updated: 02/15/23 0456     HS Troponin T 7 ng/L      Troponin T Delta --     Comment: Unable to calculate.       Narrative:      High Sensitive Troponin T Reference Range:  <10.0 ng/L- Negative Female for AMI  <15.0 ng/L- Negative Male for AMI  >=10 - Abnormal Female indicating possible myocardial injury.  >=15 - Abnormal Male indicating possible myocardial injury.   Clinicians would have to utilize clinical acumen, EKG, Troponin, and serial changes to determine if it is an Acute Myocardial Infarction or myocardial injury due to an underlying chronic condition.         Creatinine Serum (kidney function) GFR component [[id number]]  (Normal) Collected: 02/15/23 0404    Specimen: Blood Updated: 02/15/23 0456     Creatinine 0.77 mg/dL      eGFR 86.8 mL/min/1.73     Narrative:      GFR Normal >60  Chronic Kidney Disease  <60  Kidney Failure <15      Hepatic Function Panel [[id number]] Collected: 02/15/23 0404    Specimen: Blood Updated: 02/15/23 0456     Total Protein 7.4 g/dL      Albumin 4.3 g/dL      ALT (SGPT) 19 U/L      AST (SGOT) 23 U/L      Alkaline Phosphatase 102 U/L      Total Bilirubin 0.8 mg/dL      Bilirubin, Direct 0.2 mg/dL      Bilirubin, Indirect 0.6 mg/dL     Extra Tubes [[id number]] Collected: 02/15/23 0147    Specimen: Blood, Venous Line Updated: 02/15/23 0300    Narrative:      The following orders were created for panel order Extra Tubes.  Procedure                               Abnormality         Status                     ---------                               -----------         ------                     Green Top (Gel)[[id number]]                                  Final result               Green Top (Gel)[[id number]]                                  Final result                 Please view results for these tests on the individual orders.    Green Top (Gel) [[id number]] Collected: 02/15/23 0147    Specimen: Blood Updated: 02/15/23 0300     Extra Tube Hold for add-ons.     Comment: Auto resulted.       Green Top (Gel) [[id number]] Collected: 02/15/23 0147    Specimen: Blood Updated: 02/15/23 0300     Extra Tube Hold for add-ons.     Comment: Auto resulted.       D-dimer, Quantitative [[id number]]  (Abnormal) Collected: 02/15/23 0123    Specimen: Blood Updated: 02/15/23 0150     D-Dimer, Quantitative 1.72 mg/L (FEU)     Narrative:      According to the assay 's published package insert, a normal (<0.50 mg/L (FEU)) D-dimer result in conjunction with a non-high clinical probability assessment, excludes deep vein thrombosis (DVT) and pulmonary embolism (PE) with high sensitivity.    D-dimer values increase with age and this can make VTE exclusion of an older population difficult. To address this, the [employer], based on best available evidence and recent guidelines, recommends that  "clinicians use age-adjusted D-dimer thresholds in patients greater than 50 years of age with: a) a low probability of PE who do not meet all Pulmonary Embolism Rule Out Criteria, or b) in those with intermediate probability of PE.   The formula for an age-adjusted D-dimer cut-off is \"age/100\".  For example, a 60 year old patient would have an age-adjusted cut-off of 0.60 mg/L (FEU) and an 80 year old 0.80 mg/L (FEU).    Comprehensive Metabolic Panel [[id number]]  (Abnormal) Collected: 02/14/23 2345    Specimen: Blood Updated: 02/15/23 0028     Glucose 221 mg/dL      BUN 12 mg/dL      Creatinine 0.91 mg/dL      Sodium 139 mmol/L      Potassium 3.9 mmol/L      Chloride 102 mmol/L      CO2 24.0 mmol/L      Calcium 9.4 mg/dL      Total Protein 7.6 g/dL      Albumin 4.1 g/dL      ALT (SGPT) 17 U/L      AST (SGOT) 20 U/L      Alkaline Phosphatase 93 U/L      Total Bilirubin 0.7 mg/dL      Globulin 3.5 gm/dL      A/G Ratio 1.2 g/dL      BUN/Creatinine Ratio 13.2     Anion Gap 13.0 mmol/L      eGFR 71.0 mL/min/1.73     Narrative:      GFR Normal >60  Chronic Kidney Disease <60  Kidney Failure <15      High Sensitivity Troponin T [[id number]]  (Normal) Collected: 02/14/23 2345    Specimen: Blood Updated: 02/15/23 0028     HS Troponin T <6 ng/L     Narrative:      High Sensitive Troponin T Reference Range:  <10.0 ng/L- Negative Female for AMI  <15.0 ng/L- Negative Male for AMI  >=10 - Abnormal Female indicating possible myocardial injury.  >=15 - Abnormal Male indicating possible myocardial injury.   Clinicians would have to utilize clinical acumen, EKG, Troponin, and serial changes to determine if it is an Acute Myocardial Infarction or myocardial injury due to an underlying chronic condition.         BNP [[id number]]  (Normal) Collected: 02/14/23 2228    Specimen: Blood Updated: 02/14/23 2308     proBNP 60.3 pg/mL     Narrative:      Among patients with dyspnea, NT-proBNP is highly sensitive for the detection of acute " congestive heart failure. In addition NT-proBNP of <300 pg/ml effectively rules out acute congestive heart failure with 99% negative predictive value.      CBC & Differential [[id number]]  (Abnormal) Collected: 02/14/23 2228    Specimen: Blood Updated: 02/14/23 2233    Narrative:      The following orders were created for panel order CBC & Differential.  Procedure                               Abnormality         Status                     ---------                               -----------         ------                     CBC Auto Differential[[id number]]        Abnormal            Final result                 Please view results for these tests on the individual orders.    CBC Auto Differential [[id number]]  (Abnormal) Collected: 02/14/23 2228    Specimen: Blood Updated: 02/14/23 2233     WBC 13.90 10*3/mm3      RBC 4.95 10*6/mm3      Hemoglobin 13.8 g/dL      Hematocrit 41.6 %      MCV 84.1 fL      MCH 27.9 pg      MCHC 33.2 g/dL      RDW 14.9 %      RDW-SD 46.4 fl      MPV 7.5 fL      Platelets 250 10*3/mm3      Neutrophil % 81.0 %      Lymphocyte % 10.6 %      Monocyte % 5.9 %      Eosinophil % 2.0 %      Basophil % 0.5 %      Neutrophils, Absolute 11.20 10*3/mm3      Lymphocytes, Absolute 1.50 10*3/mm3      Monocytes, Absolute 0.80 10*3/mm3      Eosinophils, Absolute 0.30 10*3/mm3      Basophils, Absolute 0.10 10*3/mm3      nRBC 0.1 /100 WBC     Blood Gas, Arterial - [[id number]]  (Abnormal) Collected: 02/14/23 2157    Specimen: Arterial Blood Updated: 02/14/23 2201     Site Right Radial     Marquez's Test Positive     pH, Arterial 7.412 pH units      pCO2, Arterial 37.9 mm Hg      pO2, Arterial 72.1 mm Hg      HCO3, Arterial 24.2 mmol/L      Base Excess, Arterial -0.2 mmol/L      Comment: Serial Number: [id number]:  [id number]        O2 Saturation, Arterial 94.5 %      CO2 Content 25.3 mmol/L      Barometric Pressure for Blood Gas --     Comment: N/A        Modality Cannula     FIO2 36 %      Hemodilution No         Imaging Results (Last 24 Hours)     Procedure Component Value Units Date/Time    CT Angiogram Chest Pulmonary Embolism [[id number]] Collected: 02/15/23 1144     Updated: 02/15/23 1151    Narrative:      CT ANGIOGRAM CHEST PULMONARY EMBOLISM    Date of Exam: 2/15/2023 11:33 AM EST    Indication: Pulmonary embolism (PE) suspected, positive D-dimer.    Comparison: None available.    Technique: Axial CT images were obtained of the chest before and after the uneventful intravenous administration of iodinated contrast utilizing pulmonary embolism protocol.  Sagittal and coronal reconstructions were performed.  Automated exposure   control and iterative reconstruction methods were used.     Findings:  No evidence of pulmonary embolism is identified. The main pulmonary artery is upper limits of normal in size. The aorta is normal in caliber. No significant coronary artery vascular calcifications. 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    The thyroid and esophagus are normal. There is a small hiatal hernia. No axillary or mediastinal adenopathy is identified. Limited views of the upper abdomen are unremarkable.    The central airways are patent. There are mild bibasilar opacities favored to represent atelectasis.     No aggressive appearing lytic or sclerotic bone lesions are identified.      Impression:      Impression:    1. No evidence of pulmonary embolism.  2. Pulmonary artery is upper limits of normal in size which can be seen with ulnar artery hypertension  3. 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 This appears to be secondary to extrinsic compression, suggestive of median arcuate ligament syndrome.    Electronically Signed: Rajiv Ko    2/15/2023 11:49 AM EST    Workstation ID: [id number]    XR Chest 1 View [[id number]]  Collected: 02/14/23 2208     Updated: 02/14/23 2212    Narrative:      XR CHEST 1 VW    Date of Exam: 2/14/2023 9:57 PM EST    Indication: cough, covid +.    Comparison: None available.    Findings:  There are hazy densities in the lung bases that could reflect artifact from overlying tissue or airspace disease. There is no pneumothorax or pleural effusion. The heart size is normal. Pulmonary vasculature is within normal limits. No acute osseous   normalities.      Impression:      Impression:  Subtle bibasilar airspace disease versus artifact from overlying soft tissue.    Electronically Signed: Alexander Carrillo    2/14/2023 10:09 PM EST    Workstation ID: [id number]

## 2023-02-15 NOTE — ED PROVIDER NOTES
Subjective   History of Present Illness  Patient presents with:  Shortness of Breath    Hailey Rae MD   No LMP recorded. Patient is postmenopausal.  Patient is a 63-year-old female presents the ED with a complaint of shortness of breath, cough.  Patient reports that she was seen at the immediate care center today, she was found to have COVID-19.  Patient does not normally wear oxygen.  She was noted to be hypoxic at the urgent care and referred to the emergency department.  Patient does report has a history of diabetes, she denies any history of COPD or asthma.        Review of Systems   Constitutional: Negative for chills and fever.   Respiratory: Positive for cough, chest tightness (With coughing) and shortness of breath.    Cardiovascular: Negative for chest pain, palpitations and leg swelling.   Gastrointestinal: Negative for abdominal pain.   Musculoskeletal: Negative for back pain and neck pain.   Skin: Negative for color change and rash.   Neurological: Negative for dizziness, syncope, weakness and light-headedness.       Past Medical History:   Diagnosis Date   • Allergic    • Arthritis    • Asthma In chart   • Breast cyst    • Cataract    • Diabetes type 1, uncontrolled    • Fibrocystic breast    • Hyperlipidemia    • Hypothyroidism    • Osteopenia    • Urinary tract infection In chart   • Vitamin D deficiency        Allergies   Allergen Reactions   • Procaine Unknown (See Comments)     novocaine-as child        Past Surgical History:   Procedure Laterality Date   • CATARACT EXTRACTION     •  SECTION     • COLONOSCOPY  2013   • ENDOMETRIAL ABLATION         Family History   Problem Relation Age of Onset   • Hypertension Mother    • Hyperlipidemia Mother    • Hypothyroidism Mother    • Hypertension Father    • Hyperlipidemia Father    • Osteoarthritis Father    • Diabetes Brother        Social History     Socioeconomic History   • Marital status:    Tobacco Use   •  "Smoking status: Never   • Smokeless tobacco: Never   Substance and Sexual Activity   • Alcohol use: No   • Drug use: No   • Sexual activity: Not Currently     Partners: Male     Birth control/protection: None           Objective   Physical Exam  Vitals and nursing note reviewed.   Constitutional:       Appearance: She is well-developed. She is ill-appearing.   HENT:      Head: Normocephalic and atraumatic.      Mouth/Throat:      Mouth: Mucous membranes are moist.      Pharynx: Oropharynx is clear.   Eyes:      Extraocular Movements: Extraocular movements intact.      Pupils: Pupils are equal, round, and reactive to light.   Cardiovascular:      Rate and Rhythm: Normal rate and regular rhythm.   Pulmonary:      Effort: Pulmonary effort is normal.      Breath sounds: Normal breath sounds.   Musculoskeletal:      Cervical back: Normal range of motion and neck supple.      Right lower leg: No tenderness. No edema.      Left lower leg: No tenderness. No edema.   Skin:     General: Skin is warm and dry.   Neurological:      General: No focal deficit present.      Mental Status: She is alert and oriented to person, place, and time.         Procedures           ED Course  ED Course as of 02/15/23 0327   Wed Feb 15, 2023   0246 [facility] staff has been unable to obtain an appropriate IV for patient's CTA. [LB]   0250 Discussed with ALTAGRACIA Vigil [LB]      ED Course User Index  [LB] Therese Spring APRN      /61   Pulse 64   Temp 99 °F (37.2 °C) (Oral)   Resp 18   Ht 165.1 cm (65\")   Wt 97.7 kg (215 lb 6.2 oz)   SpO2 93%   BMI 35.84 kg/m²   Labs Reviewed   COMPREHENSIVE METABOLIC PANEL - Abnormal; Notable for the following components:       Result Value    Glucose 221 (*)     All other components within normal limits    Narrative:     GFR Normal >60  Chronic Kidney Disease <60  Kidney Failure <15     CBC WITH AUTO DIFFERENTIAL - Abnormal; Notable for the following components:    WBC 13.90 (*)     " "Neutrophil % 81.0 (*)     Lymphocyte % 10.6 (*)     Neutrophils, Absolute 11.20 (*)     All other components within normal limits   BLOOD GAS, ARTERIAL - Abnormal; Notable for the following components:    pO2, Arterial 72.1 (*)     Base Excess, Arterial -0.2 (*)     All other components within normal limits   D-DIMER, QUANTITATIVE - Abnormal; Notable for the following components:    D-Dimer, Quantitative 1.72 (*)     All other components within normal limits    Narrative:     According to the assay 's published package insert, a normal (<0.50 mg/L (FEU)) D-dimer result in conjunction with a non-high clinical probability assessment, excludes deep vein thrombosis (DVT) and pulmonary embolism (PE) with high sensitivity.    D-dimer values increase with age and this can make VTE exclusion of an older population difficult. To address this, the [employer], based on best available evidence and recent guidelines, recommends that clinicians use age-adjusted D-dimer thresholds in patients greater than 50 years of age with: a) a low probability of PE who do not meet all Pulmonary Embolism Rule Out Criteria, or b) in those with intermediate probability of PE.   The formula for an age-adjusted D-dimer cut-off is \"age/100\".  For example, a 60 year old patient would have an age-adjusted cut-off of 0.60 mg/L (FEU) and an 80 year old 0.80 mg/L (FEU).   BNP (IN-HOUSE) - Normal    Narrative:     Among patients with dyspnea, NT-proBNP is highly sensitive for the detection of acute congestive heart failure. In addition NT-proBNP of <300 pg/ml effectively rules out acute congestive heart failure with 99% negative predictive value.     TROPONIN - Normal    Narrative:     High Sensitive Troponin T Reference Range:  <10.0 ng/L- Negative Female for AMI  <15.0 ng/L- Negative Male for AMI  >=10 - Abnormal Female indicating possible myocardial injury.  >=15 - Abnormal Male indicating possible myocardial injury. "   Clinicians would have to utilize clinical acumen, EKG, Troponin, and serial changes to determine if it is an Acute Myocardial Infarction or myocardial injury due to an underlying chronic condition.        BLOOD GAS, ARTERIAL   HIGH SENSITIVITIY TROPONIN T 2HR   CBC AND DIFFERENTIAL    Narrative:     The following orders were created for panel order CBC & Differential.  Procedure                               Abnormality         Status                     ---------                               -----------         ------                     CBC Auto Differential[[id number]]        Abnormal            Final result                 Please view results for these tests on the individual orders.   EXTRA TUBES    Narrative:     The following orders were created for panel order Extra Tubes.  Procedure                               Abnormality         Status                     ---------                               -----------         ------                     Green Top (Gel)[[id number]]                                  Final result               Green Top (Gel)[[id number]]                                  Final result                 Please view results for these tests on the individual orders.   GREEN TOP   GREEN TOP     Medications   sodium chloride 0.9 % flush 10 mL (has no administration in time range)     XR Chest 1 View   Final Result   Impression:   Subtle bibasilar airspace disease versus artifact from overlying soft tissue.      Electronically Signed: Alexander Carrillo     2/14/2023 10:09 PM EST     Workstation ID: [id number]      CT Angiogram Chest Pulmonary Embolism    (Results Pending)                                Sinus rhythm rate 91, compared to previous 1/12/2015.  No acute ST change.  Corroborated with ED attending physician, Dr. Jj.          Medical Decision Making  Amount and/or Complexity of Data Reviewed  Labs: ordered.  Radiology: ordered.  ECG/medicine tests: ordered.      Risk  Prescription drug  management.      Record review: COVID-positive at the [facility].  Reviewed [facility] note from today patient noted to have hypoxemia at the urgent care.  Patient was brought back to the emergency department room for evaluation and placed on appropriate monitoring.  Patient underwent the above exam and work-up for dyspnea and COVID-19.  Differential diagnoses considered, PE, pneumonia.  Chest x-ray reveals subtle bibasilar airspace disease versus atelectasis.  Patient is known COVID-positive.  Consultation was placed to the hospitalist, I spoke with Ernestina, we agree on admission for hypoxia, O2 requirement which is new for the patient.  Tests/studies considered but not performed: CT chest PE protocol was ordered, however patient has been stuck numerous times for IV access for the CTA, given that patient will be admitted, despite CT results, will hold off on CTA at this time, patient will continue admission to the hospitalist service, and expect she can have IV therapy evaluation in the morning for appropriate IV access.  Disposition : I discussed with the patient their test results, work-up here in the emergency department, and need for admission and further evaluation. Patient is agreeable to the plan of care. At time of disposition patients VS are reviewed, and patient without acute distress.  Opportunity was provided for questions at the bedside, all questions and concerns were addressed.  Note Disclaimer: At [facility], we believe that sharing information builds trust and better relationships. You are receiving this note because you recently visited [facility]. It is possible you will see health information before a provider has talked with you about it. This kind of information can be easy to misunderstand. To help you fully understand what it means for your health, we urge you to discuss this note with your provider.  Note dictated utilizing Dragon Dictation.   Appropriate PPE worn during patient  interactions.      Final diagnoses:   Dyspnea, unspecified type   COVID-19   Positive D dimer   Hypoxia       ED Disposition  ED Disposition     ED Disposition   Decision to Admit    Condition   --    Comment   Level of Care: Telemetry [5]   Admitting Physician: HI MORROW [[id number]]   Attending Physician: HI MORROW [[id number]]               No follow-up provider specified.       Medication List      No changes were made to your prescriptions during this visit.          Therese Spring, APRN  02/15/23 0327

## 2023-02-15 NOTE — CONSULTS
Name: Ron Caba ADMIT: 2023   : 1960  PCP: Hailey Rae MD    MRN: [id number] LOS: 0 days   AGE/SEX: 63 y.o. female  ROOM: [address] [place] [address]      Patient Care Team:  Hailey Rae MD as PCP - Bhavana Tan PA (Physician Assistant)  Chief Complaint   Patient presents with   • Shortness of Breath     CC:Evaluation for median arcuate ligament syndrome, patient complains of dyspnea which is improving    Subjective     Inpatient Vascular Surgery Consult  Consult performed by: Yogesh Mo II, MD  Consult ordered by: Alexander Torres MD        History of Present Illness   Patient is a pleasant 63 year old lady admitted with dyspnea and COVID who was found to have compression of celiac artery on a CTA that was ordered to rule out PE.  She denies any history of postprandial abdominal pain. She denies any history of weight loss secondary to any abdominal pain. She does report some referred abdominal pain from coughing, but she says her overall condition and dyspnea especially are improving since she was admitted.     Review of Systems    Past Medical History:   Diagnosis Date   • Allergic    • Arthritis    • Asthma In chart   • Breast cyst    • Cataract    • Diabetes type 1, uncontrolled    • Fibrocystic breast    • Hyperlipidemia    • Hypothyroidism    • Osteopenia    • Urinary tract infection In chart   • Vitamin D deficiency      Past Surgical History:   Procedure Laterality Date   • CATARACT EXTRACTION     •  SECTION     • COLONOSCOPY  2013   • ENDOMETRIAL ABLATION       Family History   Problem Relation Age of Onset   • Hypertension Mother    • Hyperlipidemia Mother    • Hypothyroidism Mother    • Hypertension Father    • Hyperlipidemia Father    • Osteoarthritis Father    • Diabetes Brother      Social History     Tobacco Use   • Smoking status: Never   • Smokeless tobacco: Never   Vaping Use   • Vaping Use: Never used   Substance Use Topics    • Alcohol use: No   • Drug use: No     Medications Prior to Admission   Medication Sig Dispense Refill Last Dose   • calcium carbonate (OS-CAT) 600 MG tablet Take 600 mg by mouth Daily.      • cholecalciferol (VITAMIN D3) 25 MCG (1000 UT) tablet Take 1,000 Units by mouth Daily.      • levothyroxine (SYNTHROID, LEVOTHROID) 25 MCG tablet Take 25 mcg by mouth Daily.      • NOVOLOG 100 UNIT/ML injection INJECT 100 UNITS INTO THE SKIN QD      • Omega-3 Fatty Acids (fish oil) 1000 MG capsule capsule Take 1,000 mg by mouth Daily With Breakfast.      • rosuvastatin (CRESTOR) 10 MG tablet Take 20 mg by mouth Daily.  5      albuterol sulfate HFA, 2 puff, Inhalation, 4x Daily - RT  budesonide-formoterol, 2 puff, Inhalation, BID - RT  dexamethasone, 6 mg, Oral, Daily   Or  dexamethasone, 6 mg, Intravenous, Daily  [START ON 2/16/2023] enoxaparin, 40 mg, Subcutaneous, Q24H  levothyroxine, 25 mcg, Oral, Q AM  [START ON 2/16/2023] remdesivir, 100 mg, Intravenous, Q24H  rosuvastatin, 20 mg, Oral, Nightly      insulin,   Pharmacy Consult - Remdesivir,       •  acetaminophen **OR** acetaminophen  •  albuterol sulfate HFA  •  dextrose  •  dextrose  •  glucagon (human recombinant)  •  guaiFENesin-dextromethorphan  •  ondansetron  •  Pharmacy Consult - Remdesivir  •  [COMPLETED] Insert Peripheral IV **AND** sodium chloride  Procaine    Objective     Physical Exam:  Physical Exam  Constitutional:       General: She is not in acute distress.     Appearance: She is normal weight.   Eyes:      Conjunctiva/sclera: Conjunctivae normal.   Cardiovascular:      Rate and Rhythm: Normal rate and regular rhythm.   Pulmonary:      Effort: Pulmonary effort is normal. No respiratory distress.   Abdominal:      Palpations: Abdomen is soft.      Tenderness: There is no abdominal tenderness.   Neurological:      Mental Status: She is alert.   Psychiatric:         Mood and Affect: Mood normal.         Behavior: Behavior normal.          Vital Signs and  "Labs:  Vital Signs Patient Vitals for the past 24 hrs:   BP Temp Temp src Pulse Resp SpO2 Height Weight   02/15/23 1600 133/70 -- -- 87 24 90 % -- --   02/15/23 1518 -- -- -- 102 18 91 % -- --   02/15/23 1515 -- -- -- 98 18 91 % -- --   02/15/23 0851 126/64 97.6 °F (36.4 °C) Oral 67 23 92 % -- --   02/15/23 0602 131/63 -- -- 68 22 91 % -- --   02/15/23 0446 127/60 -- -- 70 20 93 % -- --   02/15/23 0349 132/62 -- -- 76 18 91 % -- --   02/15/23 0231 122/61 -- -- 64 18 93 % -- --   02/15/23 0046 120/51 -- -- 70 18 95 % -- --   02/15/23 0001 131/67 -- -- 80 18 93 % -- --   02/14/23 2246 115/66 -- -- 78 18 94 % -- --   02/14/23 2106 -- 99 °F (37.2 °C) Oral -- -- -- -- --   02/14/23 2103 136/70 -- -- 92 18 90 % 165.1 cm (65\") 97.7 kg (215 lb 6.2 oz)     I/O:  No intake/output data recorded.    CBC    Results from last 7 days   Lab Units 02/14/23  2228   WBC 10*3/mm3 13.90*   HEMOGLOBIN g/dL 13.8   PLATELETS 10*3/mm3 250     BMP   Results from last 7 days   Lab Units 02/15/23  0404 02/14/23  2345   SODIUM mmol/L  --  139   POTASSIUM mmol/L  --  3.9   CHLORIDE mmol/L  --  102   CO2 mmol/L  --  24.0   BUN mg/dL  --  12   CREATININE mg/dL 0.77 0.91   GLUCOSE mg/dL  --  221*     Cr Clearance Estimated Creatinine Clearance: 86.5 mL/min (by C-G formula based on SCr of 0.77 mg/dL).  Coag     HbA1C   Lab Results   Component Value Date    HGBA1C 8.6 08/02/2022    HGBA1C 8.36 09/30/2021    HGBA1C 7.70 (A) 10/06/2020     Infection     Radiology(recent) XR Chest 1 View    Result Date: 2/14/2023  Impression: Subtle bibasilar airspace disease versus artifact from overlying soft tissue. Electronically Signed: Alexander Carrillo  2/14/2023 10:09 PM EST  Workstation ID: [id number]    CT Angiogram Chest Pulmonary Embolism    Result Date: 2/15/2023  Impression: 1. No evidence of pulmonary embolism. 2. Pulmonary artery is upper limits of normal in size which can be seen with ulnar artery hypertension 3. There is high-grade stenosis versus short " segment occlusion of the celiac artery shortly after its origin. This appears to be secondary to extrinsic compression, suggestive of median arcuate ligament syndrome. Electronically Signed: Rajiv Ko  2/15/2023 11:49 AM EST  Workstation ID: [id number]      Active Hospital Problems    Diagnosis  POA   • **Acute hypoxemic respiratory failure due to COVID-19 (HCC) [U07.1, J96.01]  Yes   • Elevated d-dimer [R79.89]  Yes   • Hypothyroidism (acquired) [E03.9]  Yes   • Hyperlipidemia [E78.5]  Yes   • Type 1 diabetes mellitus with diabetic neuropathy (HCC) [E10.40]  Yes      Resolved Hospital Problems   No resolved problems to display.     Problem Points:  3:  Patient has a new problem, with no additional work-up planned (max of 1)  Total problem points:3    Data Points:  1:  I have reviewed or order clinical lab test  1:  I have reviewed or order radiology test (except heart catheterization or echo)  2:  I have personally and independently review of image, tracing, or specimen  2:  I have reviewed and summation of old records and/or discussed the patients care with another health care provider  Total data points:4 or more    Risk Points:  Moderate: New diagnosis with unknown prognosis    MDM requires 2/3 (Problem points, Data points and Risk)  MDM Prob point Data point Risk   SF 1 1 Minimal   Low 2 2 Low   Mod 3 3 Moderate   High 4 4 High     Code requires 3/3 (MDM, History and Exam)  Code MDM History Exam Time   85976 SF/Low Detailed Detailed 30   28914 Mod Comprehensive Comprehensive 50   31065 High Comprehensive Comprehensive 70     Detailed history:  4 elements HPI or status of 3 chronic problems; 2-9 system ROS  Comprehensive:  4 elements HPI or status of 3 chronic problems;  10 system ROS    Detailed Exam:  12 findings from any organ system  Comprehensive Exam:  2 findings from each of 9 systems.   60061    Assessment & Plan       Acute hypoxemic respiratory failure due to COVID-19 (HCC)    Hyperlipidemia    Type 1  diabetes mellitus with diabetic neuropathy (HCC)    Hypothyroidism (acquired)    Elevated d-dimer      63 y.o. 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   I've reviewed the CTA and agree with radiologist interpretation. Near occlusion of celiac artery from the median arcuate ligament. Good filling of contrast distally and other mesenteric vessels appear healthy and patent.   Patient is asymptomatic.   No plans for vascular intervention.   I did discuss the nature of MALS and its symptoms with patient and asked her to please follow up with our office if she begins to have any of these issues and she verbalized understanding.     I discussed the patients findings and my recommendations with patient.    Yogesh Mo II, MD  02/15/23  17:57 EST    Please call my office with any question: [phone]

## 2023-02-15 NOTE — PROGRESS NOTES
"[facility] Medicine Services Daily Progress Note    Patient Name: Ron Caba  : 1960  MRN: [id number]  Primary Care Physician:  Hailey Rae MD  Date of admission: 2023      Subjective      Patient appears to be very comfortable on 4 L by nasal cannula.  Not complaining of any shortness of breath or chest pain.  O2 sats on this amount of oxygen in the high 80s/low 90s.  On dexamethasone and remdesivir.  CT angiography of the chest not showing any evidence of PE.  It did however show \"high-grade stenosis versus short segment occlusion of the celiac artery shortly after its origin. This appears to be secondary to extrinsic compression, suggestive of median arcuate ligament syndrome.\"  Have asked vascular surgery for an opinion    Objective      Vitals:   Temp:  [97.6 °F (36.4 °C)-99 °F (37.2 °C)] 97.6 °F (36.4 °C)  Heart Rate:  [64-92] 67  Resp:  [18-23] 23  BP: (115-136)/(51-70) 126/64  Flow (L/min):  [4] 4    PHYSICAL EXAM:     Constitutional:       Appearance: Normal appearance.   HENT:      Head: Normocephalic and atraumatic.   Eyes:      Extraocular Movements: Extraocular movements intact.      Pupils: Pupils are equal, round, and reactive to light.   Cardiovascular:      Rate and Rhythm: Normal rate and regular rhythm.      Heart sounds: Normal heart sounds.   Pulmonary:     Lungs are clear to auscultation bilaterally; no wheezes appreciated  Abdominal:      General: Abdomen is flat. Bowel sounds are normal. There is no distension.   Musculoskeletal:      Right lower leg: No edema.      Left lower leg: No edema.   Skin:     General: Skin is warm.   Neurological:      General: No focal deficit present.      Mental Status: AAOx3  Psychiatric:         Mood and Affect: Mood normal.         Behavior: Behavior normal.     Assessment & Plan    COVID-19 virus infection  - IV dexamethasone and remdesivir ordered  - Monitor on continuous pulse oximetry and wean oxygen as " tolerated  - pulmonary consulted  - No evidence of PE on chest CTA  - No evidence of bacterial pneumonia so I will hold antibiotics for now    Median arcuate ligament syndrome  - Unclear significance  - Have asked vascular surgery for an opinion    DVT prophylaxis:  Medical DVT prophylaxis orders are present.    CODE STATUS:    Level Of Support Discussed With: Patient  Code Status (Patient has no pulse and is not breathing): CPR (Attempt to Resuscitate)  Medical Interventions (Patient has pulse or is breathing): Full Support      Expected length of stay: TBD    Electronically signed by Alexander Torres MD, 02/15/23, 14:13 EST.  [facility]ist Team

## 2023-02-15 NOTE — ED NOTES
Nursing report ED to floor  Ron Caba  63 y.o.  female    HPI:   Chief Complaint   Patient presents with    Shortness of Breath       Admitting doctor:   Avila Garcia DO    Admitting diagnosis:   The primary encounter diagnosis was Dyspnea, unspecified type. Diagnoses of COVID-19, Positive D dimer, and Hypoxia were also pertinent to this visit.    Code status:   Current Code Status       Date Active Code Status Order ID Comments User Context       2/15/2023 0350 CPR (Attempt to Resuscitate) [id number]  Yaritza Mcmahon APRN ED        Question Answer    Code Status (Patient has no pulse and is not breathing) CPR (Attempt to Resuscitate)    Medical Interventions (Patient has pulse or is breathing) Full Support    Level Of Support Discussed With Patient                    Allergies:   Procaine    Isolation:  No active isolations     Fall Risk:  Fall Risk Assessment was completed, and patient is at moderate risk for falls.   Predictive Model Details         6 (Low) Factor Value    Calculated 2/15/2023 02:07 Age 63    Risk of Fall Model Musculoskeletal Assessment WDL     Active Peripheral IV Present     Imaging order in this encounter Present     Diastolic BP 51     Respiratory Rate 18     Skin Assessment WDL     Magnesium not on file     Drug Use No     Jaya Scale not on file     Financial Class Private Insurance     Peripheral Vascular Assessment WDL     Total Bilirubin 0.7 mg/dL     Gastrointestinal Assessment WDL     Chloride 102 mmol/L     Cardiac Assessment WDL     Albumin 4.1 g/dL     Creatinine 0.91 mg/dL     Days after Admission 0.193     Calcium 9.4 mg/dL     ALT 17 U/L     Potassium 3.9 mmol/L         Weight:       02/14/23 2103   Weight: 97.7 kg (215 lb 6.2 oz)       Intake and Output  No intake or output data in the 24 hours ending 02/15/23 0442    Diet:   Dietary Orders (From admission, onward)       Start     Ordered    02/15/23 0348  Diet: Diabetic Diets; Consistent Carbohydrate; Texture:  Regular Texture (IDDSI 7); Fluid Consistency: Thin (IDDSI 0)  Diet Effective Now        References:    Diet Order Cross[facility]   Question Answer Comment   Diets: Diabetic Diets    Diabetic Diet: Consistent Carbohydrate    Texture: Regular Texture (IDDSI 7)    Fluid Consistency: Thin (IDDSI 0)        02/15/23 0350                     Most recent vitals:   Vitals:    02/15/23 0001 02/15/23 0046 02/15/23 0231 02/15/23 0349   BP: 131/67 120/51 122/61 132/62   Pulse: 80 70 64 76   Resp: 18 18 18 18   Temp:       TempSrc:       SpO2: 93% 95% 93% 91%   Weight:       Height:           Active LDAs/IV Access:   Lines, Drains & Airways       Active LDAs       Name Placement date Placement time Site Days    Peripheral IV 02/14/23 2225 Posterior;Right Wrist 02/14/23 2225  Wrist  less than 1                    Skin Condition:   Skin Assessments (last day)       None             Labs (abnormal labs have a star):   Labs Reviewed   COMPREHENSIVE METABOLIC PANEL - Abnormal; Notable for the following components:       Result Value    Glucose 221 (*)     All other components within normal limits    Narrative:     GFR Normal >60  Chronic Kidney Disease <60  Kidney Failure <15     CBC WITH AUTO DIFFERENTIAL - Abnormal; Notable for the following components:    WBC 13.90 (*)     Neutrophil % 81.0 (*)     Lymphocyte % 10.6 (*)     Neutrophils, Absolute 11.20 (*)     All other components within normal limits   BLOOD GAS, ARTERIAL - Abnormal; Notable for the following components:    pO2, Arterial 72.1 (*)     Base Excess, Arterial -0.2 (*)     All other components within normal limits   D-DIMER, QUANTITATIVE - Abnormal; Notable for the following components:    D-Dimer, Quantitative 1.72 (*)     All other components within normal limits    Narrative:     According to the assay 's published package insert, a normal (<0.50 mg/L (FEU)) D-dimer result in conjunction with a non-high clinical probability assessment, excludes deep vein  "thrombosis (DVT) and pulmonary embolism (PE) with high sensitivity.    D-dimer values increase with age and this can make VTE exclusion of an older population difficult. To address this, the [employer], based on best available evidence and recent guidelines, recommends that clinicians use age-adjusted D-dimer thresholds in patients greater than 50 years of age with: a) a low probability of PE who do not meet all Pulmonary Embolism Rule Out Criteria, or b) in those with intermediate probability of PE.   The formula for an age-adjusted D-dimer cut-off is \"age/100\".  For example, a 60 year old patient would have an age-adjusted cut-off of 0.60 mg/L (FEU) and an 80 year old 0.80 mg/L (FEU).   BNP (IN-HOUSE) - Normal    Narrative:     Among patients with dyspnea, NT-proBNP is highly sensitive for the detection of acute congestive heart failure. In addition NT-proBNP of <300 pg/ml effectively rules out acute congestive heart failure with 99% negative predictive value.     TROPONIN - Normal    Narrative:     High Sensitive Troponin T Reference Range:  <10.0 ng/L- Negative Female for AMI  <15.0 ng/L- Negative Male for AMI  >=10 - Abnormal Female indicating possible myocardial injury.  >=15 - Abnormal Male indicating possible myocardial injury.   Clinicians would have to utilize clinical acumen, EKG, Troponin, and serial changes to determine if it is an Acute Myocardial Infarction or myocardial injury due to an underlying chronic condition.        BLOOD GAS, ARTERIAL   HIGH SENSITIVITIY TROPONIN T 2HR   HEPATIC FUNCTION PANEL   HEPATIC FUNCTION PANEL   CREATININE SERUM (KIDNEY FUNCTION) GFR COMPONENT   CREATININE SERUM (KIDNEY FUNCTION) GFR COMPONENT   CBC AND DIFFERENTIAL    Narrative:     The following orders were created for panel order CBC & Differential.  Procedure                               Abnormality         Status                     ---------                               -----------         " ------                     CBC Auto Differential[[id number]]        Abnormal            Final result                 Please view results for these tests on the individual orders.   EXTRA TUBES    Narrative:     The following orders were created for panel order Extra Tubes.  Procedure                               Abnormality         Status                     ---------                               -----------         ------                     Green Top (Gel)[[id number]]                                  Final result               Green Top (Gel)[[id number]]                                  Final result                 Please view results for these tests on the individual orders.   GREEN TOP   GREEN TOP       LOC: Person, Place, Time, and Situation    Telemetry:  Telemetry    Cardiac Monitoring Ordered: yes    EKG:   ECG 12 Lead Other; SOA   Preliminary Result   HEART RATE= 91  bpm   RR Interval= 660  ms   DE Interval= 135  ms   P Horizontal Axis= 3  deg   P Front Axis= 73  deg   QRSD Interval= 77  ms   QT Interval= 299  ms   QRS Axis= 47  deg   T Wave Axis= 71  deg   - ABNORMAL ECG -   Sinus rhythm   Low voltage, precordial leads   Nonspecific T abnrm, anterolateral leads   When compared with ECG of 12-Jan-2015 19:34:55,   Significant axis, voltage or hypertrophy change   Electronically Signed By:    Date and Time of Study: 2023-02-14 21:18:45          Medications Given in the ED:   Medications   sodium chloride 0.9 % flush 10 mL (has no administration in time range)   Enoxaparin Sodium (LOVENOX) syringe 40 mg (40 mg Subcutaneous Given 2/15/23 0424)   guaiFENesin-dextromethorphan (ROBITUSSIN DM) 100-10 MG/5ML syrup 20 mL (has no administration in time range)   albuterol sulfate HFA (PROVENTIL HFA;VENTOLIN HFA;PROAIR HFA) inhaler 2 puff (has no administration in time range)   dexamethasone (DECADRON) tablet 6 mg (6 mg Oral Given 2/15/23 0423)     Or   dexamethasone (DECADRON) injection 6 mg ( Intravenous Not Given:   See Alt 2/15/23 0423)   Pharmacy Consult - Remdesivir (has no administration in time range)   remdesivir 200 mg in 290 mL NS (has no administration in time range)     Followed by   remdesivir 100 mg in sodium chloride 0.9 % 250 mL IVPB (powder vial) (has no administration in time range)   ondansetron (ZOFRAN) injection 4 mg (has no administration in time range)       Imaging results:  XR Chest 1 View    Result Date: 2/14/2023  Impression: Subtle bibasilar airspace disease versus artifact from overlying soft tissue. Electronically Signed: Alexander Carrillo  2/14/2023 10:09 PM EST  Workstation ID: [id number]     Social issues:   Social History     Socioeconomic History    Marital status:    Tobacco Use    Smoking status: Never    Smokeless tobacco: Never   Substance and Sexual Activity    Alcohol use: No    Drug use: No    Sexual activity: Not Currently     Partners: Male     Birth control/protection: None       NIH Stroke Scale:  Interval: (not recorded)  1a. Level of Consciousness: (not recorded)  1b. LOC Questions: (not recorded)  1c. LOC Commands: (not recorded)  2. Best Gaze: (not recorded)  3. Visual: (not recorded)  4. Facial Palsy: (not recorded)  5a. Motor Arm, Left: (not recorded)  5b. Motor Arm, Right: (not recorded)  6a. Motor Leg, Left: (not recorded)  6b. Motor Leg, Right: (not recorded)  7. Limb Ataxia: (not recorded)  8. Sensory: (not recorded)  9. Best Language: (not recorded)  10. Dysarthria: (not recorded)  11. Extinction and Inattention (formerly Neglect): (not recorded)    Total (NIH Stroke Scale): (not recorded)     Additional notable assessment information:     Nursing report ED to floor:    Janay Marcelo RN   02/15/23 04:42 EST

## 2023-02-15 NOTE — H&P
[facility] Medicine Services  History & Physical    Patient Name: Ron Caba  : 1960  MRN: [id number]  Primary Care Physician:  Hailey Rae MD  Date of admission: 2023  Date and Time of Service: 02/15/2023 at 0300    Subjective      Chief Complaint: shortness of breath, cough    History of Present Illness: Ron Caba is a 63 y.o. female with PMH of type 1 diabetes, hypothyroidism, hypercholesterolemia, vitamin D deficiency who presented to [facility] ED 2023 with complaints of shortness of breath and cough that started on 2023. She denied any fever, no nausea or vomiting, no chest pain. She has never had covid-19 in the past. She is vaccinated. She is a . She tested positive for COVID-19 at a [facility] (results reviewed in care everywhere) and was sent to the ER due to hypoxia. She denied any history of blood clots.     In the ED the patient was hypoxic and placed on 4 L O2 via nasal cannula.  ABG showed pH 7.41, PCO2 37.9, PO2 72.1, HCO3 24.2 on 36% FiO2.  CMP showed glucose of 221.  WBC 13.9, D-dimer elevated at 1.72.  Normal troponin.  Chest x-ray showed subtle bibasilar airspace disease versus artifact from overlying soft tissue.  Patient had temperature of 99.  Unable to gain IV access in the ED therefore CT PE protocol still pending.  She was admitted for further treatment of acute hypoxic respiratory failure secondary to COVID-19.  Pulmonary embolus has not yet been ruled out.      Review of Systems   Constitutional: Negative.   HENT: Positive for hoarse voice.    Eyes: Negative.    Cardiovascular: Negative.    Respiratory: Positive for cough and shortness of breath.    Endocrine: Negative.    Hematologic/Lymphatic: Negative.    Skin: Negative.    Musculoskeletal: Negative.    Gastrointestinal: Negative.    Genitourinary: Negative.    Neurological: Negative.    Psychiatric/Behavioral: Negative.    Allergic/Immunologic:  "Negative.    All other systems reviewed and are negative.       Personal History     Past Medical History:   Diagnosis Date   • Allergic    • Arthritis    • Asthma In chart   • Breast cyst    • Cataract    • Diabetes type 1, uncontrolled    • Fibrocystic breast    • Hyperlipidemia    • Hypothyroidism    • Osteopenia    • Urinary tract infection In chart   • Vitamin D deficiency        Past Surgical History:   Procedure Laterality Date   • CATARACT EXTRACTION     •  SECTION     • COLONOSCOPY  2013   • ENDOMETRIAL ABLATION         Family History: family history includes Diabetes in her brother; Hyperlipidemia in her father and mother; Hypertension in her father and mother; Hypothyroidism in her mother; Osteoarthritis in her father.     Social History:  reports that she has never smoked. She has never used smokeless tobacco. She reports that she does not drink alcohol and does not use drugs.    Home Medications:  Prior to Admission Medications     Prescriptions Last Dose Informant Patient Reported? Taking?    calcium carbonate (OS-CAT) 600 MG tablet   Yes No    Take 600 mg by mouth Daily.    cholecalciferol (VITAMIN D3) 25 MCG (1000 UT) tablet   Yes No    Take 1,000 Units by mouth Daily.    CONTOUR NEXT TEST test strip   Yes No    EST BLOOD SUGAR SIX TIMES D    Insulin Syringe-Needle U-100 31G X 5/16\" 0.3 ML misc   Yes No    BD SAFETYGLIDE INSULIN SYRINGE 31G X 5/16\" 0.3 ML    levothyroxine (SYNTHROID, LEVOTHROID) 25 MCG tablet   Yes No    Take 25 mcg by mouth Daily.    NOVOLOG 100 UNIT/ML injection   Yes No    INJECT 100 UNITS INTO THE SKIN QD    Omega-3 Fatty Acids (fish oil) 1000 MG capsule capsule  Self Yes No    Take 1,000 mg by mouth Daily With Breakfast.    rosuvastatin (CRESTOR) 10 MG tablet   Yes No    Take 10 mg by mouth Daily.            Allergies:  Allergies   Allergen Reactions   • Procaine Unknown (See Comments)     novocaine-as child        Objective      Vitals:   Temp:  [99 " °F (37.2 °C)] 99 °F (37.2 °C)  Heart Rate:  [64-92] 76  Resp:  [18] 18  BP: (115-136)/(51-70) 132/62  Flow (L/min):  [4] 4    Physical Exam  Vitals and nursing note reviewed.   HENT:      Head: Normocephalic and atraumatic.   Eyes:      Extraocular Movements: Extraocular movements intact.      Pupils: Pupils are equal, round, and reactive to light.   Cardiovascular:      Rate and Rhythm: Normal rate and regular rhythm.      Pulses: Normal pulses.      Heart sounds: Normal heart sounds.   Pulmonary:      Effort: Pulmonary effort is normal.      Breath sounds: Examination of the right-lower field reveals decreased breath sounds. Examination of the left-lower field reveals decreased breath sounds. Decreased breath sounds present.   Abdominal:      General: Bowel sounds are normal.      Palpations: Abdomen is soft.      Tenderness: There is no abdominal tenderness.   Musculoskeletal:         General: Normal range of motion.   Skin:     General: Skin is warm and dry.   Neurological:      Mental Status: She is alert and oriented to person, place, and time.   Psychiatric:         Mood and Affect: Mood normal.         Behavior: Behavior normal.          Result Review    Result Review:  I have personally reviewed the results from the time of this admission to 2/15/2023 04:04 EST and agree with these findings:  [x]  Laboratory  []  Microbiology  [x]  Radiology  []  EKG/Telemetry   []  Cardiology/Vascular   []  Pathology  [x]  Old records      Assessment & Plan        Active Hospital Problems:  Active Hospital Problems    Diagnosis    • Acute hypoxemic respiratory failure due to COVID-19 (HCC)    • Elevated d-dimer    • Hypothyroidism (acquired)    • Hyperlipidemia    • Type 1 diabetes mellitus with diabetic neuropathy (HCC)      Plan:     Acute hypoxemic respiratory failure with hypoxia secondary to Covid-19  -symptoms onset 2/13/2023  -positive Covid-19 swab on 2/14/2023 at [facility]  -hypoxic 87% on room  air, placed on 4L O2 via NC  -ABG: pH 7.41, PCO2 37.9, PO2 72.1, HCO3 24.2 on 36% FiO2  -CXR:  subtle bibasilar airspace disease versus artifact from overlying soft tissue  -D-dimer elevated at 1.72  -WBC 13.9, temperature 99  -CT PE protocol pending, pt needs IV access due to multiple failed attempts. IV team consult  -consult pharmacy for remdesivir, albuterol inhaler prn, dexamethasone, lovenox  -continuous pulse oximetry     Elevated d-dimer  -d-dimer 1.72 as above  -CT PE protocol pending r/o PE  -VTE lovenox ordered and may need to be adjusted depending on CT results    DM type 1  -pt has insulin pump and will continue to manage her pump    Hypothyroidism  -resume home synthroid when verified     Hypercholesterolemia  -cont home statin when verified       DVT prophylaxis:  Medical DVT prophylaxis orders are present.    CODE STATUS:    Level Of Support Discussed With: Patient  Code Status (Patient has no pulse and is not breathing): CPR (Attempt to Resuscitate)  Medical Interventions (Patient has pulse or is breathing): Full Support    Admission Status:  I believe this patient meets inpatient status.    I discussed the patient's findings and my recommendations with patient.    This patient has been examined wearing appropriate Personal Protective Equipment. 02/15/23      Signature: Electronically signed by ALTAGRACIA Perales, 02/15/23, 04:04 EST.

## 2023-02-15 NOTE — CASE MANAGEMENT/SOCIAL WORK
Discharge Planning Assessment   Ty     Patient Name: Ron Caba  MRN: [id number]  Today's Date: 2/15/2023    Admit Date: 2/14/2023    Plan: DC Plan: COVID + Return home, pt has no home O2. She has her truck here, will drive home.   Discharge Needs Assessment     Row Name 02/15/23 1333       Living Environment    People in Home alone    Current Living Arrangements home    Primary Care Provided by self    Provides Primary Care For no one    Family Caregiver if Needed child(boone), adult    Family Caregiver Names Dez    Quality of Family Relationships unable to assess    Able to Return to Prior Arrangements yes       Resource/Environmental Concerns    Resource/Environmental Concerns none    Transportation Concerns none       Transition Planning    Patient/Family Anticipates Transition to home    Patient/Family Anticipated Services at Transition none    Transportation Anticipated car, drives self       Discharge Needs Assessment    Readmission Within the Last 30 Days no previous admission in last 30 days    Equipment Currently Used at Home glucometer;medication pump    Concerns to be Addressed discharge planning    Concerns Comments No home O2.    Anticipated Changes Related to Illness none    Discharge Coordination/Progress DC Plan: COVID + Return home, pt has no home O2. She has her truck here, will drive home.               Discharge Plan     Row Name 02/15/23 9753       Plan    Plan DC Plan: COVID + Return home, pt has no home O2. She has her truck here, will drive home.    Plan Comments O2 4L pnc, Remdesivir 1/5. IV Decadron, WBC 13.9. Consults Vasc surgery and pulm pending. Insulin pump per pt. IADLs, Confirms insurance and PCP. Pharmacy [facility] [address], requests Meds to Beds.              Continued Care and Services - Admitted Since 2/14/2023    Coordination has not been started for this encounter.       Expected Discharge Date and Time     Expected Discharge Date Expected Discharge Time    Feb  20, 2023          Demographic Summary     Row Name 02/15/23 1332       General Information    Admission Type inpatient    Arrived From emergency department    Referral Source admission list    Reason for Consult discharge planning    Preferred Language English    General Information Comments Spoke to pt per phone due to COVID 19 isolation.               Functional Status     Row Name 02/15/23 1333       Functional Status    Usual Activity Tolerance good    Current Activity Tolerance moderate       Functional Status, IADL    Medications independent    Meal Preparation independent    Housekeeping independent    Laundry independent    Shopping independent       Mental Status    General Appearance WDL WDL       Mental Status Summary    Recent Changes in Mental Status/Cognitive Functioning no changes              Phone communication or documentation only - no physical contact with patient or family.  COVID 19 +           Iliana Melvin RN

## 2023-02-15 NOTE — PLAN OF CARE
Goal Outcome Evaluation:  Plan of Care Reviewed With: patient        Progress: no change  Outcome Evaluation: Pt resting in bed with no complaints. CT PE done today. In COVID precautions. Up in room. Will continue to monitor.

## 2023-02-16 ENCOUNTER — READMISSION MANAGEMENT (OUTPATIENT)
Dept: CALL CENTER | Facility: HOSPITAL | Age: 63
End: 2023-02-16
Payer: COMMERCIAL

## 2023-02-16 VITALS
TEMPERATURE: 98 F | DIASTOLIC BLOOD PRESSURE: 67 MMHG | WEIGHT: 215.39 LBS | HEIGHT: 65 IN | SYSTOLIC BLOOD PRESSURE: 130 MMHG | RESPIRATION RATE: 19 BRPM | BODY MASS INDEX: 35.89 KG/M2 | HEART RATE: 75 BPM | OXYGEN SATURATION: 93 %

## 2023-02-16 LAB
ALBUMIN SERPL-MCNC: 4 G/DL (ref 3.5–5.2)
ALBUMIN/GLOB SERPL: 1.3 G/DL
ALP SERPL-CCNC: 89 U/L (ref 39–117)
ALT SERPL W P-5'-P-CCNC: 17 U/L (ref 1–33)
ANION GAP SERPL CALCULATED.3IONS-SCNC: 12 MMOL/L (ref 5–15)
AST SERPL-CCNC: 21 U/L (ref 1–32)
BASOPHILS # BLD AUTO: 0 10*3/MM3 (ref 0–0.2)
BASOPHILS NFR BLD AUTO: 0.3 % (ref 0–1.5)
BILIRUB CONJ SERPL-MCNC: <0.2 MG/DL (ref 0–0.3)
BILIRUB SERPL-MCNC: 0.3 MG/DL (ref 0–1.2)
BUN SERPL-MCNC: 19 MG/DL (ref 8–23)
BUN/CREAT SERPL: 20.9 (ref 7–25)
CALCIUM SPEC-SCNC: 9.5 MG/DL (ref 8.6–10.5)
CHLORIDE SERPL-SCNC: 102 MMOL/L (ref 98–107)
CO2 SERPL-SCNC: 24 MMOL/L (ref 22–29)
CREAT SERPL-MCNC: 0.91 MG/DL (ref 0.57–1)
DEPRECATED RDW RBC AUTO: 44.2 FL (ref 37–54)
EGFRCR SERPLBLD CKD-EPI 2021: 71 ML/MIN/1.73
EOSINOPHIL # BLD AUTO: 0.1 10*3/MM3 (ref 0–0.4)
EOSINOPHIL NFR BLD AUTO: 0.9 % (ref 0.3–6.2)
ERYTHROCYTE [DISTWIDTH] IN BLOOD BY AUTOMATED COUNT: 14.9 % (ref 12.3–15.4)
GLOBULIN UR ELPH-MCNC: 3.2 GM/DL
GLUCOSE SERPL-MCNC: 93 MG/DL (ref 65–99)
HCT VFR BLD AUTO: 39.5 % (ref 34–46.6)
HGB BLD-MCNC: 12.7 G/DL (ref 12–15.9)
LDH SERPL-CCNC: 234 U/L (ref 135–214)
LYMPHOCYTES # BLD AUTO: 1.9 10*3/MM3 (ref 0.7–3.1)
LYMPHOCYTES NFR BLD AUTO: 14.5 % (ref 19.6–45.3)
MCH RBC QN AUTO: 27.3 PG (ref 26.6–33)
MCHC RBC AUTO-ENTMCNC: 32.2 G/DL (ref 31.5–35.7)
MCV RBC AUTO: 85 FL (ref 79–97)
MONOCYTES # BLD AUTO: 1.1 10*3/MM3 (ref 0.1–0.9)
MONOCYTES NFR BLD AUTO: 8.6 % (ref 5–12)
NEUTROPHILS NFR BLD AUTO: 75.7 % (ref 42.7–76)
NEUTROPHILS NFR BLD AUTO: 9.6 10*3/MM3 (ref 1.7–7)
NRBC BLD AUTO-RTO: 0 /100 WBC (ref 0–0.2)
PLATELET # BLD AUTO: 278 10*3/MM3 (ref 140–450)
PMV BLD AUTO: 8.2 FL (ref 6–12)
POTASSIUM SERPL-SCNC: 3.5 MMOL/L (ref 3.5–5.2)
PROCALCITONIN SERPL-MCNC: 0.04 NG/ML (ref 0–0.25)
PROT SERPL-MCNC: 7.2 G/DL (ref 6–8.5)
RBC # BLD AUTO: 4.65 10*6/MM3 (ref 3.77–5.28)
SODIUM SERPL-SCNC: 138 MMOL/L (ref 136–145)
WBC NRBC COR # BLD: 12.7 10*3/MM3 (ref 3.4–10.8)

## 2023-02-16 PROCEDURE — 84145 PROCALCITONIN (PCT): CPT | Performed by: NURSE PRACTITIONER

## 2023-02-16 PROCEDURE — 94799 UNLISTED PULMONARY SVC/PX: CPT

## 2023-02-16 PROCEDURE — 94618 PULMONARY STRESS TESTING: CPT

## 2023-02-16 PROCEDURE — 83615 LACTATE (LD) (LDH) ENZYME: CPT | Performed by: NURSE PRACTITIONER

## 2023-02-16 PROCEDURE — 80053 COMPREHEN METABOLIC PANEL: CPT | Performed by: NURSE PRACTITIONER

## 2023-02-16 PROCEDURE — 82248 BILIRUBIN DIRECT: CPT | Performed by: NURSE PRACTITIONER

## 2023-02-16 PROCEDURE — 85025 COMPLETE CBC W/AUTO DIFF WBC: CPT | Performed by: NURSE PRACTITIONER

## 2023-02-16 PROCEDURE — 36415 COLL VENOUS BLD VENIPUNCTURE: CPT | Performed by: NURSE PRACTITIONER

## 2023-02-16 PROCEDURE — 25010000002 REMDESIVIR 100 MG/20ML SOLUTION 1 EACH VIAL: Performed by: NURSE PRACTITIONER

## 2023-02-16 PROCEDURE — 63710000001 DEXAMETHASONE PER 0.25 MG: Performed by: NURSE PRACTITIONER

## 2023-02-16 RX ORDER — DEXAMETHASONE 6 MG/1
6 TABLET ORAL DAILY
Qty: 8 TABLET | Refills: 0 | Status: SHIPPED | OUTPATIENT
Start: 2023-02-16 | End: 2023-02-24

## 2023-02-16 RX ADMIN — BUDESONIDE AND FORMOTEROL FUMARATE DIHYDRATE 2 PUFF: 160; 4.5 AEROSOL RESPIRATORY (INHALATION) at 06:54

## 2023-02-16 RX ADMIN — Medication 10 ML: at 07:57

## 2023-02-16 RX ADMIN — LEVOTHYROXINE SODIUM 25 MCG: 0.03 TABLET ORAL at 05:47

## 2023-02-16 RX ADMIN — REMDESIVIR 100 MG: 100 INJECTION, POWDER, LYOPHILIZED, FOR SOLUTION INTRAVENOUS at 05:47

## 2023-02-16 RX ADMIN — ALBUTEROL SULFATE 2 PUFF: 108 INHALANT RESPIRATORY (INHALATION) at 06:58

## 2023-02-16 RX ADMIN — DEXAMETHASONE 6 MG: 4 TABLET ORAL at 07:57

## 2023-02-16 NOTE — DISCHARGE PLACEMENT REQUEST
"Regan Caba (63 y.o. 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    Marital Status                               Admission Date   2/14/23    Admission Type   Emergency    Admitting Provider   Avila Garcia DO    Attending Provider   Alexander Torres MD    Department, Room/Bed   [facility] 2C MEDICAL INPATIENT, 250/1       Discharge Date       Discharge Disposition   Home or Self Care    Discharge Destination                               Attending Provider: Alexander Torres MD    Allergies: Procaine    Isolation: Enh Drop/Con   Infection: COVID (confirmed) (02/15/23)   Code Status: CPR    Ht: 165.1 cm (65\")   Wt: 97.7 kg (215 lb 6.2 oz)    Admission Cmt: None   Principal Problem: Acute hypoxemic respiratory failure due to COVID-19 (HCC) [U07.1,J96.01]                 Active Insurance as of 2/14/2023     Primary Coverage     Payor Plan Insurance Group Employer/Plan Group    [employer] EM[employer]EE [id number]     Payor Plan Address Payor Plan Phone Number Payor Plan Fax Number Effective Dates    [id number] [phone]  1/1/2015 - None Entered    [place] [address]       Subscriber Name Subscriber Birth Date Member ID       REGAN CABA 1960 [id number]                 Emergency Contacts      (Rel.) Home Phone Work Phone Mobile Phone    ANGEL CABA JR (Son) [phone] -- [phone]    SAVANNA IRWIN (Father) [phone] -- [phone]               History & Physical      Yaritza Mcmahon APRN at 02/15/23 0250     Attestation signed by Avila Garcia DO at 02/15/23 0601    Documentation reviewed, agree with plan of NP supervised by physician.  There were no concerns on patient care and management made known by the NP to supervising physician.                "         [facility] Medicine Services  History & Physical    Patient Name: Ron Caba  : 1960  MRN: [id number]  Primary Care Physician:  Hailey Rae MD  Date of admission: 2023  Date and Time of Service: 02/15/2023 at 0300    Subjective       Chief Complaint: shortness of breath, cough    History of Present Illness: Ron Caba is a 63 y.o. female with PMH of type 1 diabetes, hypothyroidism, hypercholesterolemia, vitamin D deficiency who presented to [facility] ED 2023 with complaints of shortness of breath and cough that started on 2023. She denied any fever, no nausea or vomiting, no chest pain. She has never had covid-19 in the past. She is vaccinated. She is a . She tested positive for COVID-19 at a [facility] (results reviewed in care everywhere) and was sent to the ER due to hypoxia. She denied any history of blood clots.     In the ED the patient was hypoxic and placed on 4 L O2 via nasal cannula.  ABG showed pH 7.41, PCO2 37.9, PO2 72.1, HCO3 24.2 on 36% FiO2.  CMP showed glucose of 221.  WBC 13.9, D-dimer elevated at 1.72.  Normal troponin.  Chest x-ray showed subtle bibasilar airspace disease versus artifact from overlying soft tissue.  Patient had temperature of 99.  Unable to gain IV access in the ED therefore CT PE protocol still pending.  She was admitted for further treatment of acute hypoxic respiratory failure secondary to COVID-19.  Pulmonary embolus has not yet been ruled out.      Review of Systems   Constitutional: Negative.   HENT: Positive for hoarse voice.    Eyes: Negative.    Cardiovascular: Negative.    Respiratory: Positive for cough and shortness of breath.    Endocrine: Negative.    Hematologic/Lymphatic: Negative.    Skin: Negative.    Musculoskeletal: Negative.    Gastrointestinal: Negative.    Genitourinary: Negative.    Neurological: Negative.    Psychiatric/Behavioral: Negative.   "  Allergic/Immunologic: Negative.    All other systems reviewed and are negative.       Personal History     Past Medical History:   Diagnosis Date   • Allergic    • Arthritis    • Asthma In chart   • Breast cyst    • Cataract    • Diabetes type 1, uncontrolled    • Fibrocystic breast    • Hyperlipidemia    • Hypothyroidism    • Osteopenia    • Urinary tract infection In chart   • Vitamin D deficiency        Past Surgical History:   Procedure Laterality Date   • CATARACT EXTRACTION     •  SECTION     • COLONOSCOPY  2013   • ENDOMETRIAL ABLATION         Family History: family history includes Diabetes in her brother; Hyperlipidemia in her father and mother; Hypertension in her father and mother; Hypothyroidism in her mother; Osteoarthritis in her father.     Social History:  reports that she has never smoked. She has never used smokeless tobacco. She reports that she does not drink alcohol and does not use drugs.    Home Medications:  Prior to Admission Medications     Prescriptions Last Dose Informant Patient Reported? Taking?    calcium carbonate (OS-CAT) 600 MG tablet   Yes No    Take 600 mg by mouth Daily.    cholecalciferol (VITAMIN D3) 25 MCG (1000 UT) tablet   Yes No    Take 1,000 Units by mouth Daily.    CONTOUR NEXT TEST test strip   Yes No    EST BLOOD SUGAR SIX TIMES D    Insulin Syringe-Needle U-100 31G X 5/16\" 0.3 ML misc   Yes No    BD SAFETYGLIDE INSULIN SYRINGE 31G X 5/16\" 0.3 ML    levothyroxine (SYNTHROID, LEVOTHROID) 25 MCG tablet   Yes No    Take 25 mcg by mouth Daily.    NOVOLOG 100 UNIT/ML injection   Yes No    INJECT 100 UNITS INTO THE SKIN QD    Omega-3 Fatty Acids (fish oil) 1000 MG capsule capsule  Self Yes No    Take 1,000 mg by mouth Daily With Breakfast.    rosuvastatin (CRESTOR) 10 MG tablet   Yes No    Take 10 mg by mouth Daily.            Allergies:  Allergies   Allergen Reactions   • Procaine Unknown (See Comments)     novocaine-as child        Objective   "     Vitals:   Temp:  [99 °F (37.2 °C)] 99 °F (37.2 °C)  Heart Rate:  [64-92] 76  Resp:  [18] 18  BP: (115-136)/(51-70) 132/62  Flow (L/min):  [4] 4    Physical Exam  Vitals and nursing note reviewed.   HENT:      Head: Normocephalic and atraumatic.   Eyes:      Extraocular Movements: Extraocular movements intact.      Pupils: Pupils are equal, round, and reactive to light.   Cardiovascular:      Rate and Rhythm: Normal rate and regular rhythm.      Pulses: Normal pulses.      Heart sounds: Normal heart sounds.   Pulmonary:      Effort: Pulmonary effort is normal.      Breath sounds: Examination of the right-lower field reveals decreased breath sounds. Examination of the left-lower field reveals decreased breath sounds. Decreased breath sounds present.   Abdominal:      General: Bowel sounds are normal.      Palpations: Abdomen is soft.      Tenderness: There is no abdominal tenderness.   Musculoskeletal:         General: Normal range of motion.   Skin:     General: Skin is warm and dry.   Neurological:      Mental Status: She is alert and oriented to person, place, and time.   Psychiatric:         Mood and Affect: Mood normal.         Behavior: Behavior normal.          Result Review    Result Review:  I have personally reviewed the results from the time of this admission to 2/15/2023 04:04 EST and agree with these findings:  [x]  Laboratory  []  Microbiology  [x]  Radiology  []  EKG/Telemetry   []  Cardiology/Vascular   []  Pathology  [x]  Old records      Assessment & Plan        Active Hospital Problems:  Active Hospital Problems    Diagnosis    • Acute hypoxemic respiratory failure due to COVID-19 (HCC)    • Elevated d-dimer    • Hypothyroidism (acquired)    • Hyperlipidemia    • Type 1 diabetes mellitus with diabetic neuropathy (HCC)      Plan:     Acute hypoxemic respiratory failure with hypoxia secondary to Covid-19  -symptoms onset 2/13/2023  -positive Covid-19 swab on 2/14/2023 at [facility]  center  -hypoxic 87% on room air, placed on 4L O2 via NC  -ABG: pH 7.41, PCO2 37.9, PO2 72.1, HCO3 24.2 on 36% FiO2  -CXR:  subtle bibasilar airspace disease versus artifact from overlying soft tissue  -D-dimer elevated at 1.72  -WBC 13.9, temperature 99  -CT PE protocol pending, pt needs IV access due to multiple failed attempts. IV team consult  -consult pharmacy for remdesivir, albuterol inhaler prn, dexamethasone, lovenox  -continuous pulse oximetry     Elevated d-dimer  -d-dimer 1.72 as above  -CT PE protocol pending r/o PE  -VTE lovenox ordered and may need to be adjusted depending on CT results    DM type 1  -pt has insulin pump and will continue to manage her pump    Hypothyroidism  -resume home synthroid when verified     Hypercholesterolemia  -cont home statin when verified       DVT prophylaxis:  Medical DVT prophylaxis orders are present.    CODE STATUS:    Level Of Support Discussed With: Patient  Code Status (Patient has no pulse and is not breathing): CPR (Attempt to Resuscitate)  Medical Interventions (Patient has pulse or is breathing): Full Support    Admission Status:  I believe this patient meets inpatient status.    I discussed the patient's findings and my recommendations with patient.    This patient has been examined wearing appropriate Personal Protective Equipment. 02/15/23      Signature: Electronically signed by ALTAGRACIA Perales, 02/15/23, 04:04 EST.    Electronically signed by Avila Garcia DO at 02/15/23 0601

## 2023-02-16 NOTE — CONSULTS
PULMONARY CRITICAL CARE CONSULT NOTE      PATIENT IDENTIFICATION:  Name: Ron Caba  MRN: [id number]  :  1960     Age: 63 y.o.  Sex: female        DATE OF CONSULTATION: 2/15/2023  PRIMARY CARE PHYSICIAN    Hailey Rae MD                  CHIEF COMPLAINT: Shortness of breath    History of Present Illness:   Ron Caba is a 63 y.o. female who has history of diabetes, hypothyroidism, she is non-smoker she used to work as a teacher, started having some shortness of cough over the last couple weeks, got worse recently became more short winded and found to have COVID-19 admitted to the hospital started on oxygen supplement called to evaluate the patient, her chest x-ray is relatively within normal limit  No significant leg swellings no hemoptysis no fever no chills      Review of Systems:   Constitutional:  As above   Eyes: negative   ENT/oropharynx: negative   Cardiovascular: negative   Respiratory:  As above   Gastrointestinal: negative   Genitourinary: negative   Neurological: negative   Musculoskeletal: negative   Integument/breast: negative   Endocrine: negative   Allergic/Immunologic: negative     Past Medical History:  Past Medical History:   Diagnosis Date   • Allergic    • Arthritis    • Asthma In chart   • Breast cyst    • Cataract    • Diabetes type 1, uncontrolled    • Fibrocystic breast    • Hyperlipidemia    • Hypothyroidism    • Osteopenia    • Urinary tract infection In chart   • Vitamin D deficiency        Past Surgical History:  Past Surgical History:   Procedure Laterality Date   • CATARACT EXTRACTION     •  SECTION     • COLONOSCOPY  2013   • ENDOMETRIAL ABLATION          Family History:  Family History   Problem Relation Age of Onset   • Hypertension Mother    • Hyperlipidemia Mother    • Hypothyroidism Mother    • Hypertension Father    • Hyperlipidemia Father    • Osteoarthritis Father    • Diabetes Brother         Social History:   Social History  "    Tobacco Use   • Smoking status: Never   • Smokeless tobacco: Never   Substance Use Topics   • Alcohol use: No        Allergies:  Allergies   Allergen Reactions   • Procaine Unknown (See Comments)     novocaine-as child        Home Meds:  No medications prior to admission.       Objective:  tMax 24 hrs: Temp (24hrs), Av.9 °F (36.6 °C), Min:97.7 °F (36.5 °C), Max:98 °F (36.7 °C)      Vitals Ranges:   Temp:  [97.7 °F (36.5 °C)-98 °F (36.7 °C)] 98 °F (36.7 °C)  Heart Rate:  [] 75  Resp:  [11-24] 19  BP: (104-136)/(52-70) 130/67    Intake and Output Last 3 Shifts:   I/O last 3 completed shifts:  In: 760 [P.O.:760]  Out: -     Exam:  /67 (BP Location: Left arm, Patient Position: Sitting)   Pulse 75   Temp 98 °F (36.7 °C) (Oral)   Resp 19   Ht 165.1 cm (65\")   Wt 97.7 kg (215 lb 6.2 oz)   SpO2 93%   BMI 35.84 kg/m²       23  2103   Weight: 97.7 kg (215 lb 6.2 oz)     General Appearance:      HEENT:  Normocephalic, without obvious abnormality, atraumatic. Conjunctivae/corneas clear.  Normal external ear canals. Nares normal, no drainage.  Neck:  Supple, symmetrical, trachea midline. No JVD.  Lungs /Chest wall:   Bilateral basal rhonchi, respirations unlabored, symmetrical wall movement.     Heart:  Regular rate and rhythm, systolic murmur PMI left sternal border  Abdomen: Soft, nontender, no masses, no organomegaly.    Extremities: Trace edema, no clubbing or cyanosis        Data Review:  All labs (24hrs):   Recent Results (from the past 24 hour(s))   Comprehensive Metabolic Panel    Collection Time: 23  4:56 AM    Specimen: Blood   Result Value Ref Range    Glucose 93 65 - 99 mg/dL    BUN 19 8 - 23 mg/dL    Creatinine 0.91 0.57 - 1.00 mg/dL    Sodium 138 136 - 145 mmol/L    Potassium 3.5 3.5 - 5.2 mmol/L    Chloride 102 98 - 107 mmol/L    CO2 24.0 22.0 - 29.0 mmol/L    Calcium 9.5 8.6 - 10.5 mg/dL    Total Protein 7.2 6.0 - 8.5 g/dL    Albumin 4.0 3.5 - 5.2 g/dL    ALT (SGPT) 17 1 - " 33 U/L    AST (SGOT) 21 1 - 32 U/L    Alkaline Phosphatase 89 39 - 117 U/L    Total Bilirubin 0.3 0.0 - 1.2 mg/dL    Globulin 3.2 gm/dL    A/G Ratio 1.3 g/dL    BUN/Creatinine Ratio 20.9 7.0 - 25.0    Anion Gap 12.0 5.0 - 15.0 mmol/L    eGFR 71.0 >60.0 mL/min/1.73   Lactate Dehydrogenase    Collection Time: 02/16/23  4:56 AM    Specimen: Blood   Result Value Ref Range     (H) 135 - 214 U/L   Procalcitonin    Collection Time: 02/16/23  4:56 AM    Specimen: Blood   Result Value Ref Range    Procalcitonin 0.04 0.00 - 0.25 ng/mL   CBC Auto Differential    Collection Time: 02/16/23  4:56 AM    Specimen: Blood   Result Value Ref Range    WBC 12.70 (H) 3.40 - 10.80 10*3/mm3    RBC 4.65 3.77 - 5.28 10*6/mm3    Hemoglobin 12.7 12.0 - 15.9 g/dL    Hematocrit 39.5 34.0 - 46.6 %    MCV 85.0 79.0 - 97.0 fL    MCH 27.3 26.6 - 33.0 pg    MCHC 32.2 31.5 - 35.7 g/dL    RDW 14.9 12.3 - 15.4 %    RDW-SD 44.2 37.0 - 54.0 fl    MPV 8.2 6.0 - 12.0 fL    Platelets 278 140 - 450 10*3/mm3    Neutrophil % 75.7 42.7 - 76.0 %    Lymphocyte % 14.5 (L) 19.6 - 45.3 %    Monocyte % 8.6 5.0 - 12.0 %    Eosinophil % 0.9 0.3 - 6.2 %    Basophil % 0.3 0.0 - 1.5 %    Neutrophils, Absolute 9.60 (H) 1.70 - 7.00 10*3/mm3    Lymphocytes, Absolute 1.90 0.70 - 3.10 10*3/mm3    Monocytes, Absolute 1.10 (H) 0.10 - 0.90 10*3/mm3    Eosinophils, Absolute 0.10 0.00 - 0.40 10*3/mm3    Basophils, Absolute 0.00 0.00 - 0.20 10*3/mm3    nRBC 0.0 0.0 - 0.2 /100 WBC   Bilirubin, Direct    Collection Time: 02/16/23  4:56 AM    Specimen: Blood   Result Value Ref Range    Bilirubin, Direct <0.2 0.0 - 0.3 mg/dL        Imaging:  XR Chest 1 View    Result Date: 2/14/2023  XR CHEST 1 VW Date of Exam: 2/14/2023 9:57 PM EST Indication: cough, covid +. Comparison: None available. Findings: There are hazy densities in the lung bases that could reflect artifact from overlying tissue or airspace disease. There is no pneumothorax or pleural effusion. The heart size is normal.  Pulmonary vasculature is within normal limits. No acute osseous normalities.     Impression: Subtle bibasilar airspace disease versus artifact from overlying soft tissue. Electronically Signed: Alexander Carrillo  2/14/2023 10:09 PM EST  Workstation ID: [id number]    CT Angiogram Chest Pulmonary Embolism    Result Date: 2/15/2023  CT ANGIOGRAM CHEST PULMONARY EMBOLISM Date of Exam: 2/15/2023 11:33 AM EST Indication: Pulmonary embolism (PE) suspected, positive D-dimer. Comparison: None available. Technique: Axial CT images were obtained of the chest before and after the uneventful intravenous administration of iodinated contrast utilizing pulmonary embolism protocol.  Sagittal and coronal reconstructions were performed.  Automated exposure control and iterative reconstruction methods were used. Findings: No evidence of pulmonary embolism is identified. The main pulmonary artery is upper limits of normal in size. The aorta is normal in caliber. No significant coronary artery vascular calcifications. High-grade stenosis/occlusion just after the origin of the celiac artery appears to be secondary to extrinsic compression and median arcuate ligament syndrome. The thyroid and esophagus are normal. There is a small hiatal hernia. No axillary or mediastinal adenopathy is identified. Limited views of the upper abdomen are unremarkable. The central airways are patent. There are mild bibasilar opacities favored to represent atelectasis.  No aggressive appearing lytic or sclerotic bone lesions are identified.     Impression: 1. No evidence of pulmonary embolism. 2. Pulmonary artery is upper limits of normal in size which can be seen with ulnar artery hypertension 3. 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 This appears to be secondary to extrinsic compression, suggestive of median arcuate ligament syndrome. Electronically Signed: Rajiv Ko  2/15/2023 11:49 AM EST   Workstation ID: [id number]       Current:  albuterol sulfate HFA, 2 puff, Inhalation, 4x Daily - RT  budesonide-formoterol, 2 puff, Inhalation, BID - RT  dexamethasone, 6 mg, Oral, Daily   Or  dexamethasone, 6 mg, Intravenous, Daily  enoxaparin, 40 mg, Subcutaneous, Q24H  levothyroxine, 25 mcg, Oral, Q AM  remdesivir, 100 mg, Intravenous, Q24H  rosuvastatin, 20 mg, Oral, Nightly        Infusion:  insulin,   Pharmacy Consult - Remdesivir,          PRN:  •  acetaminophen **OR** acetaminophen  •  albuterol sulfate HFA  •  dextrose  •  dextrose  •  glucagon (human recombinant)  •  guaiFENesin-dextromethorphan  •  ondansetron  •  Pharmacy Consult - Remdesivir  •  [COMPLETED] Insert Peripheral IV **AND** sodium chloride    ASSESSMENT:    Acute hypoxemic respiratory failure due to COVID-19 (HCC)    Hyperlipidemia    Type 1 diabetes mellitus with diabetic neuropathy (HCC)    Hypothyroidism (acquired)    Elevated d-dimer  Obstructive sleep apnea    PLAN:  We will going to arrange for the patient to have a CT chest PE protocol  Continue on the steroid for now dexamethasone  And currently on remdesivir  Antibiotics  Bronchodilator  Inhaled corticosteroids  Electrolytes/ glycemic control  DVT and GI prophylaxis.  Total Critical care time in direct medical management (   ) minutes, This time specifically excludes time spent performing procedures.       Jessenia Concepcion MD. D, ABSM.  2/15/2023  12:38 EST

## 2023-02-16 NOTE — PROGRESS NOTES
Exercise Oximetry    Patient Name:Ron Caba   MRN: [id number]   Date: 02/16/23             ROOM AIR BASELINE   SpO2% 92%   Heart Rate    Blood Pressure      EXERCISE ON ROOM AIR SpO2% EXERCISE ON O2 @ 2 LPM SpO2%   1 MINUTE 90% 1 MINUTE n/a   2 MINUTES 89% 2 MINUTES n/a   3 MINUTES 88% 3 MINUTES 90%    4 MINUTES n/a 4 MINUTES 91%   5 MINUTES n/a 5 MINUTES 91%   6 MINUTES n/a 6 MINUTES 91%              Distance Walked  n/a Distance Walked around patient's room   Dyspnea (Berna Scale)   Dyspnea (Berna Scale)   Fatigue (Berna Scale)   Fatigue (Berna Scale)   SpO2% Post Exercise  n/a SpO2% Post Exercise 93%   HR Post Exercise  n/a HR Post Exercise 70   Time to Recovery  n/a Time to Recovery n/a     Comments: Patient does not currently wear o2 @home. Room air baseline sat while sitting on edge of bed was 92%. Had patient stand up from edge of bed. Walked w/patient back and forth from window to door in patient's room several times on room air. Sats dipped down to 88% on room air 2X while ambulating. Stopped the second occurrence and placed patient on 2lpm nasal cannula. Sats stayed @90% and above on 2lpm nasal cannula. No shortness of breath observed.

## 2023-02-16 NOTE — PAYOR COMM NOTE
"This is discharge notification for Regan Caba   Reference/Auth # [id number]  Pt discharged routine to home on 2/16/23.    Fawn Porras RN, BSN  Utilization Review Nurse  [facility]  Direct & confidential phone # [phone]  Fax # [phone]      Regan Caba (63 y.o. Female)     Date of Birth   1960    Social Security Number       Address   [address] [place] [address] [place] [address]    Home Phone   [phone]    MRN   [id number]       Druze   Mu-ism    Marital Status                               Admission Date   2/14/23    Admission Type   Emergency    Admitting Provider   Avila Garcia DO    Attending Provider       Department, Room/Bed   [facility] 2C MEDICAL INPATIENT, 250/1       Discharge Date   2/16/2023    Discharge Disposition   Home or Self Care    Discharge Destination                               Attending Provider: (none)   Allergies: Procaine    Isolation: Enh Drop/Con   Infection: COVID (confirmed) (02/15/23)   Code Status: Prior    Ht: 165.1 cm (65\")   Wt: 97.7 kg (215 lb 6.2 oz)    Admission Cmt: None   Principal Problem: Acute hypoxemic respiratory failure due to COVID-19 (HCC) [U07.1,J96.01]                 Active Insurance as of 2/14/2023     Primary Coverage     Payor Plan Insurance Group Employer/Plan Group    [facility] [employer] EMPL[employer] [id number]     Payor Plan Address Payor Plan Phone Number Payor Plan Fax Number Effective Dates    [address] [phone]  1/1/2015 - None Entered    [place] [address]       Subscriber Name Subscriber Birth Date Member ID       REGAN CABA 1960 [id number]                 Emergency Contacts      (Rel.) Home Phone Work Phone Mobile Phone    ANGEL CABA JR (Son) [phone] -- [phone]    SAVANNA IRWIN (Father) [phone] -- [phone]               Discharge Summary      Alexander Torres MD at 02/16/23 1016                    " "[facility] Medicine Services  Discharge Summary    Date of Service: 23,  Patient Name: Ron Caba  : 1960  MRN: [id number]    Date of Admission: 2023  Discharge Diagnosis: Please see the problem list below  Date of Discharge:  23,  Primary Care Physician: Hailey Rae MD    Presenting Problem:   Hypoxia [R09.02]  Positive D dimer [R79.89]  Dyspnea, unspecified type [R06.00]  Acute hypoxemic respiratory failure due to COVID-19 (HCC) [U07.1, J96.01]  COVID-19 [U07.1]    Active and Resolved Hospital Problems:  Active Hospital Problems    Diagnosis POA   • **Acute hypoxemic respiratory failure due to COVID-19 (HCC) [U07.1, J96.01] Yes   • Elevated d-dimer [R79.89] Yes   • Hypothyroidism (acquired) [E03.9] Yes   • Hyperlipidemia [E78.5] Yes   • Type 1 diabetes mellitus with diabetic neuropathy (HCC) [E10.40] Yes      Resolved Hospital Problems   No resolved problems to display.         Hospital Course     Hospital Course:  \"63 y.o. female with PMH of type 1 diabetes, hypothyroidism, hypercholesterolemia, vitamin D deficiency who presented to [facility] ED 2023 with complaints of shortness of breath and cough that started on 2023. She denied any fever, no nausea or vomiting, no chest pain. She has never had covid-19 in the past. She is vaccinated. She is a . She tested positive for COVID-19 at a [facility] (results reviewed in care everywhere) and was sent to the ER due to hypoxia. She denied any history of blood clots.      In the ED the patient was hypoxic and placed on 4 L O2 via nasal cannula.  ABG showed pH 7.41, PCO2 37.9, PO2 72.1, HCO3 24.2 on 36% FiO2.  CMP showed glucose of 221.  WBC 13.9, D-dimer elevated at 1.72.  Normal troponin.  Chest x-ray showed subtle bibasilar airspace disease versus artifact from overlying soft tissue.  Patient had temperature of 99.  Unable to gain IV access in the ED therefore CT PE protocol " "still pending.  She was admitted for further treatment of acute hypoxic respiratory failure secondary to COVID-19.  Pulmonary embolus has not yet been ruled out.\"    COVID-19 virus infection  - IV dexamethasone and remdesivir ordered  - Monitor on continuous pulse oximetry and wean oxygen as tolerated  - pulmonary consulted  - No evidence of PE on chest CTA  - No evidence of bacterial pneumonia so I will hold antibiotics for now     Median arcuate ligament syndrome  - Unclear significance  - Have asked vascular surgery for an opinion    On the day of discharge, the patient reports that she is feeling much better.  She is politely requesting to be discharged, which I think is acceptable at this time.  Patient ambulated with respiratory and does require 2 L to maintain appropriate O2 saturations.  I have ordered home oxygen and placed discharge orders.  We will send her out with a short course of dexamethasone.  No indication for antibiotics.  She should follow-up with her PCP within 1 week of discharge.    Day of Discharge     Vital Signs:  Temp:  [97.7 °F (36.5 °C)-98 °F (36.7 °C)] 98 °F (36.7 °C)  Heart Rate:  [] 75  Resp:  [11-24] 19  BP: (104-136)/(52-70) 130/67  Flow (L/min):  [2] 2    Physical Exam:  Constitutional:       Appearance: Normal appearance.   HENT:      Head: Normocephalic and atraumatic.   Eyes:      Extraocular Movements: Extraocular movements intact.      Pupils: Pupils are equal, round, and reactive to light.   Cardiovascular:      Rate and Rhythm: Normal rate and regular rhythm.      Pulses: Normal pulses.      Heart sounds: Normal heart sounds.   Pulmonary:     Lungs are clear to auscultation bilaterally; no wheezes appreciated  Abdominal:      General: Abdomen is flat. Bowel sounds are normal. There is no distension.   Musculoskeletal:      Cervical back: Normal range of motion and neck supple.      Right lower leg: No edema.      Left lower leg: No edema.   Skin:     General: Skin is " warm.   Neurological:      General: No focal deficit present.      Mental Status: He is alert and oriented to person, place, and time.   Psychiatric:         Mood and Affect: Mood normal.         Behavior: Behavior normal.    Pertinent  and/or Most Recent Results     LAB RESULTS:      Lab 02/16/23  0456 02/14/23  2228   WBC 12.70* 13.90*   HEMOGLOBIN 12.7 13.8   HEMATOCRIT 39.5 41.6   PLATELETS 278 250   NEUTROS ABS 9.60* 11.20*   LYMPHS ABS 1.90 1.50   MONOS ABS 1.10* 0.80   EOS ABS 0.10 0.30   MCV 85.0 84.1   PROCALCITONIN 0.04  --          Lab 02/16/23  0456 02/15/23  0404 02/14/23  2345   SODIUM 138  --  139   POTASSIUM 3.5  --  3.9   CHLORIDE 102  --  102   CO2 24.0  --  24.0   ANION GAP 12.0  --  13.0   BUN 19  --  12   CREATININE 0.91 0.77 0.91   EGFR 71.0 86.8 71.0   GLUCOSE 93  --  221*   CALCIUM 9.5  --  9.4         Lab 02/16/23  0456 02/15/23  0404 02/14/23  2345   TOTAL PROTEIN 7.2 7.4 7.6   ALBUMIN 4.0 4.3 4.1   GLOBULIN 3.2  --  3.5   ALT (SGPT) 17 19 17   AST (SGOT) 21 23 20   BILIRUBIN 0.3 0.8 0.7   INDIRECT BILIRUBIN  --  0.6  --    BILIRUBIN DIRECT <0.2 0.2  --    ALK PHOS 89 102 93         Lab 02/15/23  0404 02/14/23  2345 02/14/23  2228   PROBNP  --   --  60.3   HSTROP T 7 <6  --                  Lab 02/14/23  2157   PH, ARTERIAL 7.412   PCO2, ARTERIAL 37.9   PO2 ART 72.1*   O2 SATURATION ART 94.5   FIO2 36   HCO3 ART 24.2   BASE EXCESS ART -0.2*     Brief Urine Lab Results     None        Microbiology Results (last 10 days)     ** No results found for the last 240 hours. **          XR Chest 1 View    Result Date: 2/14/2023  Impression: Impression: Subtle bibasilar airspace disease versus artifact from overlying soft tissue. Electronically Signed: Alexander Carrillo  2/14/2023 10:09 PM EST  Workstation ID: [id number]    CT Angiogram Chest Pulmonary Embolism    Result Date: 2/15/2023  Impression: Impression: 1. No evidence of pulmonary embolism. 2. Pulmonary artery is upper limits of normal in size  which can be seen with ulnar artery hypertension 3. 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 This appears to be secondary to extrinsic compression, suggestive of median arcuate ligament syndrome. Electronically Signed: Rajiv Maikel  2/15/2023 11:49 AM EST  Workstation ID: [id number]                  Labs Pending at Discharge:  Pending Labs     Order Current Status    Lactate Dehydrogenase In process          Procedures Performed           Consults:   Consults     Date and Time Order Name Status Description    2/15/2023 12:22 PM Inpatient Vascular Surgery Consult Completed     2/15/2023  8:53 AM Inpatient Pulmonology Consult              Discharge Details        Discharge Medications      New Medications      Instructions Start Date   dexamethasone 6 MG tablet  Commonly known as: DECADRON   6 mg, Oral, Daily         Continue These Medications      Instructions Start Date   calcium carbonate 600 MG tablet  Commonly known as: OS-CAT   600 mg, Oral, Daily      cholecalciferol 25 MCG (1000 UT) tablet  Commonly known as: VITAMIN D3   1,000 Units, Oral, Daily      fish oil 1000 MG capsule capsule   1,000 mg, Oral, Daily With Breakfast      levothyroxine 25 MCG tablet  Commonly known as: SYNTHROID, LEVOTHROID   25 mcg, Oral, Daily      NovoLOG 100 UNIT/ML injection  Generic drug: insulin aspart   INJECT 100 UNITS INTO THE SKIN QD      rosuvastatin 10 MG tablet  Commonly known as: CRESTOR   20 mg, Oral, Daily             Allergies   Allergen Reactions   • Procaine Unknown (See Comments)     novocaine-as child          Discharge Disposition:  Home or Self Care    Diet:  Hospital:  Diet Order   Procedures   • Diet: Diabetic Diets; Consistent Carbohydrate; Texture: Regular Texture (IDDSI 7); Fluid Consistency: Thin (IDDSI 0)         Discharge Activity:         CODE STATUS:  Code Status and Medical Interventions:   Ordered at: 02/15/23 0352     Level Of Support  Discussed With:    Patient     Code Status (Patient has no pulse and is not breathing):    CPR (Attempt to Resuscitate)     Medical Interventions (Patient has pulse or is breathing):    Full Support         Future Appointments   Date Time Provider Department Center   11/27/2023  8:00 AM Hailey Rae MD [facility]           Time spent on Discharge including face to face service: 35 minutes    Signature: Electronically signed by Alexander Torres MD, 02/16/23, 10:16 EST.  [facility] Hospitalist Team      Electronically signed by Alexander Torres MD at 02/16/23 1018

## 2023-02-16 NOTE — PLAN OF CARE
Goal Outcome Evaluation:           Progress: improving  Outcome Evaluation: Patient denies pain so far this shift. insulin pump in place with log at bedside. pt has frequent nonproductive cough- PRN cough syrup given this shift. remains on 2L oxygen. Will monitor.

## 2023-02-16 NOTE — DISCHARGE SUMMARY
"          [facility] Medicine Services  Discharge Summary    Date of Service: 23,  Patient Name: Ron Caba  : 1960  MRN: [id number]    Date of Admission: 2023  Discharge Diagnosis: Please see the problem list below  Date of Discharge:  23,  Primary Care Physician: Hailey Rae MD    Presenting Problem:   Hypoxia [R09.02]  Positive D dimer [R79.89]  Dyspnea, unspecified type [R06.00]  Acute hypoxemic respiratory failure due to COVID-19 (HCC) [U07.1, J96.01]  COVID-19 [U07.1]    Active and Resolved Hospital Problems:  Active Hospital Problems    Diagnosis POA   • **Acute hypoxemic respiratory failure due to COVID-19 (HCC) [U07.1, J96.01] Yes   • Elevated d-dimer [R79.89] Yes   • Hypothyroidism (acquired) [E03.9] Yes   • Hyperlipidemia [E78.5] Yes   • Type 1 diabetes mellitus with diabetic neuropathy (HCC) [E10.40] Yes      Resolved Hospital Problems   No resolved problems to display.         Hospital Course     Hospital Course:  \"63 y.o. female with PMH of type 1 diabetes, hypothyroidism, hypercholesterolemia, vitamin D deficiency who presented to [facility] ED 2023 with complaints of shortness of breath and cough that started on 2023. She denied any fever, no nausea or vomiting, no chest pain. She has never had covid-19 in the past. She is vaccinated. She is a . She tested positive for COVID-19 at a [facility] (results reviewed in care everywhere) and was sent to the ER due to hypoxia. She denied any history of blood clots.      In the ED the patient was hypoxic and placed on 4 L O2 via nasal cannula.  ABG showed pH 7.41, PCO2 37.9, PO2 72.1, HCO3 24.2 on 36% FiO2.  CMP showed glucose of 221.  WBC 13.9, D-dimer elevated at 1.72.  Normal troponin.  Chest x-ray showed subtle bibasilar airspace disease versus artifact from overlying soft tissue.  Patient had temperature of 99.  Unable to gain IV access in the ED therefore CT " "PE protocol still pending.  She was admitted for further treatment of acute hypoxic respiratory failure secondary to COVID-19.  Pulmonary embolus has not yet been ruled out.\"    COVID-19 virus infection  - IV dexamethasone and remdesivir ordered  - Monitor on continuous pulse oximetry and wean oxygen as tolerated  - pulmonary consulted  - No evidence of PE on chest CTA  - No evidence of bacterial pneumonia so I will hold antibiotics for now     Median arcuate ligament syndrome  - Unclear significance  - Have asked vascular surgery for an opinion    On the day of discharge, the patient reports that she is feeling much better.  She is politely requesting to be discharged, which I think is acceptable at this time.  Patient ambulated with respiratory and does require 2 L to maintain appropriate O2 saturations.  I have ordered home oxygen and placed discharge orders.  We will send her out with a short course of dexamethasone.  No indication for antibiotics.  She should follow-up with her PCP within 1 week of discharge.    Day of Discharge     Vital Signs:  Temp:  [97.7 °F (36.5 °C)-98 °F (36.7 °C)] 98 °F (36.7 °C)  Heart Rate:  [] 75  Resp:  [11-24] 19  BP: (104-136)/(52-70) 130/67  Flow (L/min):  [2] 2    Physical Exam:  Constitutional:       Appearance: Normal appearance.   HENT:      Head: Normocephalic and atraumatic.   Eyes:      Extraocular Movements: Extraocular movements intact.      Pupils: Pupils are equal, round, and reactive to light.   Cardiovascular:      Rate and Rhythm: Normal rate and regular rhythm.      Pulses: Normal pulses.      Heart sounds: Normal heart sounds.   Pulmonary:     Lungs are clear to auscultation bilaterally; no wheezes appreciated  Abdominal:      General: Abdomen is flat. Bowel sounds are normal. There is no distension.   Musculoskeletal:      Cervical back: Normal range of motion and neck supple.      Right lower leg: No edema.      Left lower leg: No edema.   Skin:     " General: Skin is warm.   Neurological:      General: No focal deficit present.      Mental Status: He is alert and oriented to person, place, and time.   Psychiatric:         Mood and Affect: Mood normal.         Behavior: Behavior normal.    Pertinent  and/or Most Recent Results     LAB RESULTS:      Lab 02/16/23  0456 02/14/23  2228   WBC 12.70* 13.90*   HEMOGLOBIN 12.7 13.8   HEMATOCRIT 39.5 41.6   PLATELETS 278 250   NEUTROS ABS 9.60* 11.20*   LYMPHS ABS 1.90 1.50   MONOS ABS 1.10* 0.80   EOS ABS 0.10 0.30   MCV 85.0 84.1   PROCALCITONIN 0.04  --          Lab 02/16/23  0456 02/15/23  0404 02/14/23  2345   SODIUM 138  --  139   POTASSIUM 3.5  --  3.9   CHLORIDE 102  --  102   CO2 24.0  --  24.0   ANION GAP 12.0  --  13.0   BUN 19  --  12   CREATININE 0.91 0.77 0.91   EGFR 71.0 86.8 71.0   GLUCOSE 93  --  221*   CALCIUM 9.5  --  9.4         Lab 02/16/23  0456 02/15/23  0404 02/14/23  2345   TOTAL PROTEIN 7.2 7.4 7.6   ALBUMIN 4.0 4.3 4.1   GLOBULIN 3.2  --  3.5   ALT (SGPT) 17 19 17   AST (SGOT) 21 23 20   BILIRUBIN 0.3 0.8 0.7   INDIRECT BILIRUBIN  --  0.6  --    BILIRUBIN DIRECT <0.2 0.2  --    ALK PHOS 89 102 93         Lab 02/15/23  0404 02/14/23  2345 02/14/23  2228   PROBNP  --   --  60.3   HSTROP T 7 <6  --                  Lab 02/14/23  2157   PH, ARTERIAL 7.412   PCO2, ARTERIAL 37.9   PO2 ART 72.1*   O2 SATURATION ART 94.5   FIO2 36   HCO3 ART 24.2   BASE EXCESS ART -0.2*     Brief Urine Lab Results     None        Microbiology Results (last 10 days)     ** No results found for the last 240 hours. **          XR Chest 1 View    Result Date: 2/14/2023  Impression: Impression: Subtle bibasilar airspace disease versus artifact from overlying soft tissue. Electronically Signed: Alexander Carrillo  2/14/2023 10:09 PM EST  Workstation ID: [id number]    CT Angiogram Chest Pulmonary Embolism    Result Date: 2/15/2023  Impression: Impression: 1. No evidence of pulmonary embolism. 2. Pulmonary artery is upper limits of  normal in size which can be seen with ulnar artery hypertension 3. 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 This appears to be secondary to extrinsic compression, suggestive of median arcuate ligament syndrome. Electronically Signed: Rajiv Maikel  2/15/2023 11:49 AM EST  Workstation ID: [id number]                  Labs Pending at Discharge:  Pending Labs     Order Current Status    Lactate Dehydrogenase In process          Procedures Performed           Consults:   Consults     Date and Time Order Name Status Description    2/15/2023 12:22 PM Inpatient Vascular Surgery Consult Completed     2/15/2023  8:53 AM Inpatient Pulmonology Consult              Discharge Details        Discharge Medications      New Medications      Instructions Start Date   dexamethasone 6 MG tablet  Commonly known as: DECADRON   6 mg, Oral, Daily         Continue These Medications      Instructions Start Date   calcium carbonate 600 MG tablet  Commonly known as: OS-CAT   600 mg, Oral, Daily      cholecalciferol 25 MCG (1000 UT) tablet  Commonly known as: VITAMIN D3   1,000 Units, Oral, Daily      fish oil 1000 MG capsule capsule   1,000 mg, Oral, Daily With Breakfast      levothyroxine 25 MCG tablet  Commonly known as: SYNTHROID, LEVOTHROID   25 mcg, Oral, Daily      NovoLOG 100 UNIT/ML injection  Generic drug: insulin aspart   INJECT 100 UNITS INTO THE SKIN QD      rosuvastatin 10 MG tablet  Commonly known as: CRESTOR   20 mg, Oral, Daily             Allergies   Allergen Reactions   • Procaine Unknown (See Comments)     novocaine-as child          Discharge Disposition:  Home or Self Care    Diet:  Hospital:  Diet Order   Procedures   • Diet: Diabetic Diets; Consistent Carbohydrate; Texture: Regular Texture (IDDSI 7); Fluid Consistency: Thin (IDDSI 0)         Discharge Activity:         CODE STATUS:  Code Status and Medical Interventions:   Ordered at: 02/15/23 0350     Level  Of Support Discussed With:    Patient     Code Status (Patient has no pulse and is not breathing):    CPR (Attempt to Resuscitate)     Medical Interventions (Patient has pulse or is breathing):    Full Support         Future Appointments   Date Time Provider Department Center   11/27/2023  8:00 AM Hailey Rae MD [facility]           Time spent on Discharge including face to face service: 35 minutes    Signature: Electronically signed by Alexander Torres MD, 02/16/23, 10:16 EST.  [facility] Hospitalist Team

## 2023-02-16 NOTE — PLAN OF CARE
Goal Outcome Evaluation:  Plan of Care Reviewed With: patient        Progress: improving  Outcome Evaluation: Pt discharging with home oxygen

## 2023-02-16 NOTE — PROGRESS NOTES
"PULMONARY CRITICAL CARE PROGRESS  NOTE      PATIENT IDENTIFICATION:  Name: Ron Caba  MRN: [id number]  :  1960     Age: 63 y.o.  Sex: female    DATE OF Note:  2023   Referring Physician: Avila Garcia DO                  Subjective:   Feeling better, no new issue, no SOB, no chest pain, no nausea or vomiting, no change in bowel habit, no dysuria,  no new  skin rash or itching.      Objective:  tMax 24 hrs: Temp (24hrs), Av.9 °F (36.6 °C), Min:97.7 °F (36.5 °C), Max:98 °F (36.7 °C)      Vitals Ranges:   Temp:  [97.7 °F (36.5 °C)-98 °F (36.7 °C)] 98 °F (36.7 °C)  Heart Rate:  [] 75  Resp:  [11-24] 19  BP: (104-136)/(52-70) 130/67    Intake and Output Last 3 Shifts:   I/O last 3 completed shifts:  In: 760 [P.O.:760]  Out: -     Exam:  /67 (BP Location: Left arm, Patient Position: Sitting)   Pulse 75   Temp 98 °F (36.7 °C) (Oral)   Resp 19   Ht 165.1 cm (65\")   Wt 97.7 kg (215 lb 6.2 oz)   SpO2 93%   BMI 35.84 kg/m²     General Appearance:     HEENT:  Normocephalic, without obvious abnormality. Conjunctivae/corneas clear.  Normal external ear canals. Nares normal, no drainage     Neck:  Supple, symmetrical, trachea midline. No JVD.  Lungs /Chest wall:   Bilateral basal rhonchi, respirations unlabored, symmetrical wall movement.     Heart:  Regular rate and rhythm, systolic murmur PMI left sternal border  Abdomen: Soft, nontender, no masses, no organomegaly.    Extremities: Trace edema, no clubbing or cyanosis        Medications:  albuterol sulfate HFA, 2 puff, Inhalation, 4x Daily - RT  budesonide-formoterol, 2 puff, Inhalation, BID - RT  dexamethasone, 6 mg, Oral, Daily   Or  dexamethasone, 6 mg, Intravenous, Daily  enoxaparin, 40 mg, Subcutaneous, Q24H  levothyroxine, 25 mcg, Oral, Q AM  remdesivir, 100 mg, Intravenous, Q24H  rosuvastatin, 20 mg, Oral, Nightly        Infusion:  insulin,   Pharmacy Consult - Remdesivir,          PRN:  •  acetaminophen **OR** acetaminophen  •  " albuterol sulfate HFA  •  dextrose  •  dextrose  •  glucagon (human recombinant)  •  guaiFENesin-dextromethorphan  •  ondansetron  •  Pharmacy Consult - Remdesivir  •  [COMPLETED] Insert Peripheral IV **AND** sodium chloride  Data Review:  All labs (24hrs):   Recent Results (from the past 24 hour(s))   Comprehensive Metabolic Panel    Collection Time: 02/16/23  4:56 AM    Specimen: Blood   Result Value Ref Range    Glucose 93 65 - 99 mg/dL    BUN 19 8 - 23 mg/dL    Creatinine 0.91 0.57 - 1.00 mg/dL    Sodium 138 136 - 145 mmol/L    Potassium 3.5 3.5 - 5.2 mmol/L    Chloride 102 98 - 107 mmol/L    CO2 24.0 22.0 - 29.0 mmol/L    Calcium 9.5 8.6 - 10.5 mg/dL    Total Protein 7.2 6.0 - 8.5 g/dL    Albumin 4.0 3.5 - 5.2 g/dL    ALT (SGPT) 17 1 - 33 U/L    AST (SGOT) 21 1 - 32 U/L    Alkaline Phosphatase 89 39 - 117 U/L    Total Bilirubin 0.3 0.0 - 1.2 mg/dL    Globulin 3.2 gm/dL    A/G Ratio 1.3 g/dL    BUN/Creatinine Ratio 20.9 7.0 - 25.0    Anion Gap 12.0 5.0 - 15.0 mmol/L    eGFR 71.0 >60.0 mL/min/1.73   Lactate Dehydrogenase    Collection Time: 02/16/23  4:56 AM    Specimen: Blood   Result Value Ref Range     (H) 135 - 214 U/L   Procalcitonin    Collection Time: 02/16/23  4:56 AM    Specimen: Blood   Result Value Ref Range    Procalcitonin 0.04 0.00 - 0.25 ng/mL   CBC Auto Differential    Collection Time: 02/16/23  4:56 AM    Specimen: Blood   Result Value Ref Range    WBC 12.70 (H) 3.40 - 10.80 10*3/mm3    RBC 4.65 3.77 - 5.28 10*6/mm3    Hemoglobin 12.7 12.0 - 15.9 g/dL    Hematocrit 39.5 34.0 - 46.6 %    MCV 85.0 79.0 - 97.0 fL    MCH 27.3 26.6 - 33.0 pg    MCHC 32.2 31.5 - 35.7 g/dL    RDW 14.9 12.3 - 15.4 %    RDW-SD 44.2 37.0 - 54.0 fl    MPV 8.2 6.0 - 12.0 fL    Platelets 278 140 - 450 10*3/mm3    Neutrophil % 75.7 42.7 - 76.0 %    Lymphocyte % 14.5 (L) 19.6 - 45.3 %    Monocyte % 8.6 5.0 - 12.0 %    Eosinophil % 0.9 0.3 - 6.2 %    Basophil % 0.3 0.0 - 1.5 %    Neutrophils, Absolute 9.60 (H) 1.70 - 7.00  10*3/mm3    Lymphocytes, Absolute 1.90 0.70 - 3.10 10*3/mm3    Monocytes, Absolute 1.10 (H) 0.10 - 0.90 10*3/mm3    Eosinophils, Absolute 0.10 0.00 - 0.40 10*3/mm3    Basophils, Absolute 0.00 0.00 - 0.20 10*3/mm3    nRBC 0.0 0.0 - 0.2 /100 WBC   Bilirubin, Direct    Collection Time: 02/16/23  4:56 AM    Specimen: Blood   Result Value Ref Range    Bilirubin, Direct <0.2 0.0 - 0.3 mg/dL        Imaging:  CT Angiogram Chest Pulmonary Embolism  Narrative: CT ANGIOGRAM CHEST PULMONARY EMBOLISM    Date of Exam: 2/15/2023 11:33 AM EST    Indication: Pulmonary embolism (PE) suspected, positive D-dimer.    Comparison: None available.    Technique: Axial CT images were obtained of the chest before and after the uneventful intravenous administration of iodinated contrast utilizing pulmonary embolism protocol.  Sagittal and coronal reconstructions were performed.  Automated exposure   control and iterative reconstruction methods were used.     Findings:  No evidence of pulmonary embolism is identified. The main pulmonary artery is upper limits of normal in size. The aorta is normal in caliber. No significant coronary artery vascular calcifications. 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    The thyroid and esophagus are normal. There is a small hiatal hernia. No axillary or mediastinal adenopathy is identified. Limited views of the upper abdomen are unremarkable.    The central airways are patent. There are mild bibasilar opacities favored to represent atelectasis.     No aggressive appearing lytic or sclerotic bone lesions are identified.  Impression: Impression:    1. No evidence of pulmonary embolism.  2. Pulmonary artery is upper limits of normal in size which can be seen with ulnar artery hypertension  3. There is high-grade stenosis versus short segment occlusion of the celiac artery shortly after its  origin. This appears to be secondary to extrinsic compression, suggestive of median arcuate ligament syndrome.    Electronically Signed: Rajiv Ko    2/15/2023 11:49 AM EST    Workstation ID: [id number]       ASSESSMENT:  Acute hypoxemic respiratory failure due to COVID-19 (HCC)    Hyperlipidemia    Type 1 diabetes mellitus with diabetic neuropathy (HCC)    Hypothyroidism (acquired)    Elevated d-dimer  Obstructive sleep apnea     PLAN:  Okay to go home follow-up as outpatient on oral prednisone  Oxygen supplement for now  We will reevaluate the patient next week  From the CAT scan at the patient has underlying chronic obstructive airway disease     Bronchodilator  Inhaled corticosteroids  Electrolytes/ glycemic control  DVT and GI prophylaxis.    Total Critical care time in direct medical management (   ) minutes. This time specifically excludes time spent performing procedures.  Jessenia Concepcion MD. D, ABSM.     2/16/2023  12:44 EST

## 2023-02-17 ENCOUNTER — TRANSITIONAL CARE MANAGEMENT TELEPHONE ENCOUNTER (OUTPATIENT)
Dept: CALL CENTER | Facility: HOSPITAL | Age: 63
End: 2023-02-17
Payer: COMMERCIAL

## 2023-02-17 NOTE — OUTREACH NOTE
Call Center TCM Note    Flowsheet Row Responses   [facility]   Does the patient have one of the following disease processes/diagnoses(primary or secondary)? Other   TCM attempt successful? No  [No verbal release]   Unsuccessful attempts Attempt 1          iNmo Mg RN    2/17/2023, 14:43 EST

## 2023-02-17 NOTE — OUTREACH NOTE
Call Center TCM Note    Flowsheet Row Responses   [facility] facility patient discharged from? [facility]   Does the patient have one of the following disease processes/diagnoses(primary or secondary)? Other   TCM attempt successful? No   Unsuccessful attempts Attempt 2   Call Status Left message          Mara Beckman RN    2/17/2023, 15:44 EST

## 2023-02-18 ENCOUNTER — TRANSITIONAL CARE MANAGEMENT TELEPHONE ENCOUNTER (OUTPATIENT)
Dept: CALL CENTER | Facility: HOSPITAL | Age: 63
End: 2023-02-18
Payer: COMMERCIAL

## 2023-02-18 NOTE — OUTREACH NOTE
Call Center TCM Note    Flowsheet Row Responses   [facility] facility patient discharged from? [facility]   Does the patient have one of the following disease processes/diagnoses(primary or secondary)? Other   TCM attempt successful? No   Unsuccessful attempts Attempt 3  [No updated verbal release on file from PCP group ]          Haily Auguste RN    2/18/2023, 09:54 EST

## 2023-02-24 ENCOUNTER — OFFICE VISIT (OUTPATIENT)
Dept: FAMILY MEDICINE CLINIC | Facility: CLINIC | Age: 63
End: 2023-02-24
Payer: COMMERCIAL

## 2023-02-24 ENCOUNTER — LAB (OUTPATIENT)
Dept: FAMILY MEDICINE CLINIC | Facility: CLINIC | Age: 63
End: 2023-02-24
Payer: COMMERCIAL

## 2023-02-24 VITALS
HEIGHT: 65 IN | HEART RATE: 45 BPM | TEMPERATURE: 98.4 F | BODY MASS INDEX: 37.05 KG/M2 | WEIGHT: 222.4 LBS | RESPIRATION RATE: 16 BRPM | SYSTOLIC BLOOD PRESSURE: 126 MMHG | OXYGEN SATURATION: 96 % | DIASTOLIC BLOOD PRESSURE: 66 MMHG

## 2023-02-24 DIAGNOSIS — U07.1 ACUTE HYPOXEMIC RESPIRATORY FAILURE DUE TO COVID-19: Primary | ICD-10-CM

## 2023-02-24 DIAGNOSIS — J96.01 ACUTE HYPOXEMIC RESPIRATORY FAILURE DUE TO COVID-19: Primary | ICD-10-CM

## 2023-02-24 DIAGNOSIS — J96.01 ACUTE HYPOXEMIC RESPIRATORY FAILURE DUE TO COVID-19: ICD-10-CM

## 2023-02-24 DIAGNOSIS — E10.40 TYPE 1 DIABETES MELLITUS WITH DIABETIC NEUROPATHY: ICD-10-CM

## 2023-02-24 DIAGNOSIS — I77.4 MEDIAN ARCUATE LIGAMENT SYNDROME: ICD-10-CM

## 2023-02-24 DIAGNOSIS — U07.1 ACUTE HYPOXEMIC RESPIRATORY FAILURE DUE TO COVID-19: ICD-10-CM

## 2023-02-24 PROBLEM — J01.00 SUBACUTE MAXILLARY SINUSITIS: Status: RESOLVED | Noted: 2020-04-03 | Resolved: 2023-02-24

## 2023-02-24 PROBLEM — Z23 NEED FOR VACCINATION: Status: RESOLVED | Noted: 2020-08-05 | Resolved: 2023-02-24

## 2023-02-24 PROBLEM — Z23 NEED FOR 23-POLYVALENT PNEUMOCOCCAL POLYSACCHARIDE VACCINE: Status: RESOLVED | Noted: 2021-11-12 | Resolved: 2023-02-24

## 2023-02-24 LAB
BASOPHILS # BLD AUTO: 0.02 10*3/MM3 (ref 0–0.2)
BASOPHILS NFR BLD AUTO: 0.1 % (ref 0–1.5)
DEPRECATED RDW RBC AUTO: 42.4 FL (ref 37–54)
EOSINOPHIL # BLD AUTO: 0.01 10*3/MM3 (ref 0–0.4)
EOSINOPHIL NFR BLD AUTO: 0.1 % (ref 0.3–6.2)
ERYTHROCYTE [DISTWIDTH] IN BLOOD BY AUTOMATED COUNT: 13.7 % (ref 12.3–15.4)
HBA1C MFR BLD: 8.2 % (ref 3.5–5.6)
HCT VFR BLD AUTO: 39.3 % (ref 34–46.6)
HGB BLD-MCNC: 13 G/DL (ref 12–15.9)
IMM GRANULOCYTES # BLD AUTO: 0.17 10*3/MM3 (ref 0–0.05)
IMM GRANULOCYTES NFR BLD AUTO: 1.2 % (ref 0–0.5)
LYMPHOCYTES # BLD AUTO: 1.39 10*3/MM3 (ref 0.7–3.1)
LYMPHOCYTES NFR BLD AUTO: 9.9 % (ref 19.6–45.3)
MCH RBC QN AUTO: 28.4 PG (ref 26.6–33)
MCHC RBC AUTO-ENTMCNC: 33.1 G/DL (ref 31.5–35.7)
MCV RBC AUTO: 85.8 FL (ref 79–97)
MONOCYTES # BLD AUTO: 0.79 10*3/MM3 (ref 0.1–0.9)
MONOCYTES NFR BLD AUTO: 5.6 % (ref 5–12)
NEUTROPHILS NFR BLD AUTO: 11.61 10*3/MM3 (ref 1.7–7)
NEUTROPHILS NFR BLD AUTO: 83.1 % (ref 42.7–76)
NRBC BLD AUTO-RTO: 0 /100 WBC (ref 0–0.2)
PLATELET # BLD AUTO: 284 10*3/MM3 (ref 140–450)
PMV BLD AUTO: 10 FL (ref 6–12)
RBC # BLD AUTO: 4.58 10*6/MM3 (ref 3.77–5.28)
WBC NRBC COR # BLD: 13.99 10*3/MM3 (ref 3.4–10.8)

## 2023-02-24 PROCEDURE — 99213 OFFICE O/P EST LOW 20 MIN: CPT | Performed by: FAMILY MEDICINE

## 2023-02-24 PROCEDURE — 83036 HEMOGLOBIN GLYCOSYLATED A1C: CPT | Performed by: FAMILY MEDICINE

## 2023-02-24 PROCEDURE — 80048 BASIC METABOLIC PNL TOTAL CA: CPT | Performed by: FAMILY MEDICINE

## 2023-02-24 PROCEDURE — 85025 COMPLETE CBC W/AUTO DIFF WBC: CPT | Performed by: FAMILY MEDICINE

## 2023-02-24 PROCEDURE — 36415 COLL VENOUS BLD VENIPUNCTURE: CPT | Performed by: FAMILY MEDICINE

## 2023-02-24 PROCEDURE — 83615 LACTATE (LD) (LDH) ENZYME: CPT | Performed by: FAMILY MEDICINE

## 2023-02-24 NOTE — PROGRESS NOTES
Subjective   Chief Complaint   Patient presents with   • Hospital Follow Up Visit   • COVID 19 infection     Ron Caba is a 63 y.o. female.     Patient Care Team:  Hailey Rae MD as PCP - General  Bhavana Turner PA (Physician Assistant)    History of Present Illness  She is coming in today to follow-up on her recent hospital admission.  She was admitted to [facility] after she started having severe cough with breathing problems, she was noted to be hypoxemic.  While in the hospital she was on supplemental oxygen, steroid, and nebulizing treatments.  She also had remdesivir.  She was discharged home with dexamethasone and today is her last dose.  She is already being treated for type 1 diabetes and her blood sugar has been slightly elevated since the diagnosis of COVID-19.  She is still on oxygen, but she is weaning herself off and uses it only as needed throughout the day.  She is actually feeling much better.  Her cough has resolved and she denies any chest pains or difficulty breathing.  During the hospital evaluation she had CT angio done which showed occlusion and the celiac artery which was ultimately found to be due to median arcuate ligament syndrome.  Patient however is asymptomatic from that standpoint.  She was evaluated by vascular surgeon who advised against any procedure due to patient being asymptomatic.       The following portions of the patient's history were reviewed and updated as appropriate: allergies, current medications, past family history, past medical history, past social history, past surgical history and problem list.  Past Medical History:   Diagnosis Date   • Allergic    • Arthritis    • Asthma In chart   • Breast cyst    • Cataract    • Diabetes type 1, uncontrolled    • Fibrocystic breast    • Hyperlipidemia    • Hypothyroidism    • Osteopenia    • Urinary tract infection In chart   • Vitamin D deficiency      Past Surgical History:   Procedure  "Laterality Date   • CATARACT EXTRACTION     •  SECTION     • COLONOSCOPY  2013   • ENDOMETRIAL ABLATION       The patient has a family history of  Family History   Problem Relation Age of Onset   • Hypertension Mother    • Hyperlipidemia Mother    • Hypothyroidism Mother    • Hypertension Father    • Hyperlipidemia Father    • Osteoarthritis Father    • Diabetes Brother      Social History     Socioeconomic History   • Marital status:    Tobacco Use   • Smoking status: Never   • Smokeless tobacco: Never   Vaping Use   • Vaping Use: Never used   Substance and Sexual Activity   • Alcohol use: No   • Drug use: No   • Sexual activity: Not Currently     Partners: Male     Birth control/protection: None       Review of Systems   Constitutional: Negative for activity change, fatigue and fever.   Respiratory: Negative for shortness of breath and wheezing.    Cardiovascular: Negative for chest pain, palpitations and leg swelling.   Musculoskeletal: Negative for arthralgias and back pain.   Skin: Negative for rash.   Neurological: Negative for tremors and headache.     Visit Vitals  /66 (BP Location: Left arm, Patient Position: Sitting, Cuff Size: Large Adult)   Pulse (!) 45   Temp 98.4 °F (36.9 °C) (Infrared)   Resp 16   Ht 165.1 cm (65\")   Wt 101 kg (222 lb 6.4 oz)   SpO2 96%   BMI 37.01 kg/m²       Class 2 Severe Obesity (BMI >=35 and <=39.9). Obesity-related health conditions include the following: diabetes mellitus. Obesity is unchanged. BMI is is above average; BMI management plan is completed. We discussed portion control and increasing exercise.      Current Outpatient Medications:   •  calcium carbonate (OS-CAT) 600 MG tablet, Take 600 mg by mouth Daily., Disp: , Rfl:   •  cholecalciferol (VITAMIN D3) 25 MCG (1000 UT) tablet, Take 1,000 Units by mouth Daily., Disp: , Rfl:   •  levothyroxine (SYNTHROID, LEVOTHROID) 25 MCG tablet, Take 25 mcg by mouth Daily., Disp: , Rfl:   •  " NOVOLOG 100 UNIT/ML injection, INJECT 100 UNITS INTO THE SKIN QD, Disp: , Rfl:   •  Omega-3 Fatty Acids (fish oil) 1000 MG capsule capsule, Take 1,000 mg by mouth Daily With Breakfast., Disp: , Rfl:   •  rosuvastatin (CRESTOR) 10 MG tablet, Take 20 mg by mouth Daily., Disp: , Rfl: 5    Objective   Physical Exam  Vitals and nursing note reviewed.   Constitutional:       General: She is not in acute distress.     Appearance: She is well-developed.   HENT:      Head: Normocephalic and atraumatic.      Right Ear: External ear normal.      Left Ear: External ear normal.      Mouth/Throat:      Pharynx: No oropharyngeal exudate.   Eyes:      Conjunctiva/sclera: Conjunctivae normal.      Pupils: Pupils are equal, round, and reactive to light.   Cardiovascular:      Rate and Rhythm: Normal rate and regular rhythm.      Heart sounds: Normal heart sounds.   Pulmonary:      Effort: Pulmonary effort is normal. No respiratory distress.      Breath sounds: Normal breath sounds. No wheezing or rales.   Musculoskeletal:      Cervical back: Normal range of motion and neck supple.   Skin:     General: Skin is warm and dry.      Findings: No rash.         XR Chest 1 View    Result Date: 2/14/2023  Impression: Impression: Subtle bibasilar airspace disease versus artifact from overlying soft tissue. Electronically Signed: Alexander Carrillo  2/14/2023 10:09 PM EST  Workstation ID: [id number]    CT Angiogram Chest Pulmonary Embolism    Result Date: 2/15/2023  Impression: Impression: 1. No evidence of pulmonary embolism. 2. Pulmonary artery is upper limits of normal in size which can be seen with ulnar artery hypertension 3. 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 This appears to be secondary to extrinsic compression, suggestive of median arcuate ligament syndrome. Electronically Signed: Rajiv Ko  2/15/2023 11:49 AM EST  Workstation ID: [id number]      FOLLOWING LABS WERE  REVIEWED TODAY:  CMP    CMP 2/14/23 2/15/23 2/15/23 2/16/23     [id number]    Glucose 221 (A)   93   BUN 12   19   Creatinine 0.91  0.77 0.91   eGFR 71.0  86.8 71.0   Sodium 139   138   Potassium 3.9   3.5   Chloride 102   102   Calcium 9.4   9.5   Total Protein 7.6 7.4  7.2   Albumin 4.1 4.3  4.0   Globulin 3.5   3.2   Total Bilirubin 0.7 0.8  0.3   Alkaline Phosphatase 93 102  89   AST (SGOT) 20 23  21   ALT (SGPT) 17 19  17   Albumin/Globulin Ratio 1.2   1.3   BUN/Creatinine Ratio 13.2   20.9   Anion Gap 13.0   12.0   (A) Abnormal value            CBC    CBC 2/14/23 2/16/23   WBC 13.90 (A) 12.70 (A)   RBC 4.95 4.65   Hemoglobin 13.8 12.7   Hematocrit 41.6 39.5   MCV 84.1 85.0   MCH 27.9 27.3   MCHC 33.2 32.2   RDW 14.9 14.9   Platelets 250 278   (A) Abnormal value            Most Recent A1C    HGBA1C Most Recent 8/2/22   Hemoglobin A1C 8.6               Assessment & Plan   Diagnoses and all orders for this visit:    1. Acute hypoxemic respiratory failure due to COVID-19 (HCC) (Primary)  -     CBC Auto Differential  -     Basic Metabolic Panel  -     Lactate Dehydrogenase; Future    2. Median arcuate ligament syndrome (HCC)    3. Type 1 diabetes mellitus with diabetic neuropathy (HCC)  -     Hemoglobin A1c      I reviewed her available hospital records including blood work and imaging.  I will be getting some labs to follow-up on blood work abnormalities.  CT of the chest was negative for pulmonary embolism.  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  This appears to be secondary to extrinsic compression, suggestive of median arcuate ligament syndrome.  Patient is apparently asymptomatic from that standpoint and evaluation by vascular surgeon was done and no intervention recommended.  Patient will continue to wean herself off oxygen.  She is back at work and tolerating physical activity well.    Return in about 6 months (around 8/24/2023) for Annual  physical.    Requested Prescriptions      No prescriptions requested or ordered in this encounter

## 2023-02-25 LAB
ANION GAP SERPL CALCULATED.3IONS-SCNC: 9 MMOL/L (ref 5–15)
BUN SERPL-MCNC: 18 MG/DL (ref 8–23)
BUN/CREAT SERPL: 22.2 (ref 7–25)
CALCIUM SPEC-SCNC: 8.7 MG/DL (ref 8.6–10.5)
CHLORIDE SERPL-SCNC: 103 MMOL/L (ref 98–107)
CO2 SERPL-SCNC: 26 MMOL/L (ref 22–29)
CREAT SERPL-MCNC: 0.81 MG/DL (ref 0.57–1)
EGFRCR SERPLBLD CKD-EPI 2021: 81.7 ML/MIN/1.73
GLUCOSE SERPL-MCNC: 150 MG/DL (ref 65–99)
LDH SERPL-CCNC: 227 U/L (ref 135–214)
POTASSIUM SERPL-SCNC: 3.7 MMOL/L (ref 3.5–5.2)
SODIUM SERPL-SCNC: 138 MMOL/L (ref 136–145)

## 2023-02-26 DIAGNOSIS — J96.01 ACUTE HYPOXEMIC RESPIRATORY FAILURE DUE TO COVID-19: Primary | ICD-10-CM

## 2023-02-26 DIAGNOSIS — U07.1 ACUTE HYPOXEMIC RESPIRATORY FAILURE DUE TO COVID-19: Primary | ICD-10-CM

## 2023-04-10 ENCOUNTER — OFFICE (OUTPATIENT)
Dept: URBAN - METROPOLITAN AREA PATHOLOGY 4 | Facility: PATHOLOGY | Age: 63
End: 2023-04-10
Payer: COMMERCIAL

## 2023-04-10 ENCOUNTER — ON CAMPUS - OUTPATIENT (OUTPATIENT)
Dept: URBAN - METROPOLITAN AREA HOSPITAL 2 | Facility: HOSPITAL | Age: 63
End: 2023-04-10
Payer: COMMERCIAL

## 2023-04-10 VITALS
SYSTOLIC BLOOD PRESSURE: 104 MMHG | DIASTOLIC BLOOD PRESSURE: 86 MMHG | HEART RATE: 80 BPM | TEMPERATURE: 97.8 F | SYSTOLIC BLOOD PRESSURE: 108 MMHG | RESPIRATION RATE: 18 BRPM | DIASTOLIC BLOOD PRESSURE: 66 MMHG | OXYGEN SATURATION: 95 % | DIASTOLIC BLOOD PRESSURE: 55 MMHG | HEART RATE: 83 BPM | OXYGEN SATURATION: 97 % | HEIGHT: 65 IN | SYSTOLIC BLOOD PRESSURE: 145 MMHG | OXYGEN SATURATION: 98 % | SYSTOLIC BLOOD PRESSURE: 120 MMHG | SYSTOLIC BLOOD PRESSURE: 131 MMHG | HEART RATE: 78 BPM | WEIGHT: 218 LBS | HEART RATE: 75 BPM | DIASTOLIC BLOOD PRESSURE: 63 MMHG | DIASTOLIC BLOOD PRESSURE: 65 MMHG | HEART RATE: 76 BPM | RESPIRATION RATE: 16 BRPM | HEART RATE: 73 BPM | OXYGEN SATURATION: 93 % | DIASTOLIC BLOOD PRESSURE: 62 MMHG | DIASTOLIC BLOOD PRESSURE: 78 MMHG | SYSTOLIC BLOOD PRESSURE: 111 MMHG | SYSTOLIC BLOOD PRESSURE: 102 MMHG | DIASTOLIC BLOOD PRESSURE: 99 MMHG | SYSTOLIC BLOOD PRESSURE: 130 MMHG | HEART RATE: 89 BPM | OXYGEN SATURATION: 100 % | HEART RATE: 71 BPM

## 2023-04-10 DIAGNOSIS — D12.5 BENIGN NEOPLASM OF SIGMOID COLON: ICD-10-CM

## 2023-04-10 DIAGNOSIS — Z12.11 ENCOUNTER FOR SCREENING FOR MALIGNANT NEOPLASM OF COLON: ICD-10-CM

## 2023-04-10 DIAGNOSIS — K63.5 POLYP OF COLON: ICD-10-CM

## 2023-04-10 LAB
GI HISTOLOGY: A. UNSPECIFIED: (no result)
GI HISTOLOGY: PDF REPORT: (no result)

## 2023-04-10 PROCEDURE — 88305 TISSUE EXAM BY PATHOLOGIST: CPT | Performed by: INTERNAL MEDICINE

## 2023-04-10 PROCEDURE — 45385 COLONOSCOPY W/LESION REMOVAL: CPT | Mod: 33 | Performed by: INTERNAL MEDICINE

## 2023-08-04 ENCOUNTER — HOSPITAL ENCOUNTER (OUTPATIENT)
Dept: MAMMOGRAPHY | Facility: HOSPITAL | Age: 63
Discharge: HOME | End: 2023-08-04
Admitting: FAMILY MEDICINE
Payer: COMMERCIAL

## 2023-08-04 DIAGNOSIS — Z12.31 VISIT FOR SCREENING MAMMOGRAM: ICD-10-CM

## 2023-08-04 PROCEDURE — 77067 SCR MAMMO BI INCL CAD: CPT

## 2023-08-04 PROCEDURE — 77063 BREAST TOMOSYNTHESIS BI: CPT

## 2023-08-21 DIAGNOSIS — R06.83 SNORING: Primary | ICD-10-CM

## 2023-08-30 NOTE — OUTREACH NOTE
Prep Survey    Flowsheet Row Responses   [facility] discharged from? [facility]   Is LACE score < 7 ? No   Eligibility [facility]   Date of Admission 02/14/23   Date of Discharge 02/16/23   Discharge Disposition Home or Self Care   Discharge diagnosis Acute hypoxemic respiratory failure due to COVID-19    Does the patient have one of the following disease processes/diagnoses(primary or secondary)? Other   Does the patient have Home health ordered? No   Is there a DME ordered? No   Prep survey completed? Yes          Enriqueta COUGHLIN - Registered Nurse         Plastic Surgeon Procedure Text (E): After obtaining clear surgical margins the patient was sent to plastics for surgical repair. The patient understands they will receive post-surgical care and follow-up from the referring physician's office.

## 2023-10-03 ENCOUNTER — OFFICE VISIT (OUTPATIENT)
Dept: NEUROLOGY | Facility: CLINIC | Age: 63
End: 2023-10-03
Payer: COMMERCIAL

## 2023-10-03 VITALS
WEIGHT: 227 LBS | DIASTOLIC BLOOD PRESSURE: 70 MMHG | HEIGHT: 65 IN | BODY MASS INDEX: 37.82 KG/M2 | SYSTOLIC BLOOD PRESSURE: 125 MMHG | HEART RATE: 91 BPM

## 2023-10-03 DIAGNOSIS — G47.33 OSA (OBSTRUCTIVE SLEEP APNEA): Primary | ICD-10-CM

## 2023-10-03 PROCEDURE — 99204 OFFICE O/P NEW MOD 45 MIN: CPT | Performed by: PSYCHIATRY & NEUROLOGY

## 2023-10-03 NOTE — PROGRESS NOTES
"Chief Complaint  Snoring    Subjective          Ron Caba presents to [facility]  History of Present Illness    The patient c/o daytime sleepiness issues:   No .      The patient complains of snoring loud in all sleeping positions. Family reports she stops breathing in her sleep.     The patient complains of Leg symptoms: discomfort on a creepy crawly feeling in legs when resting or relaxing and this may partially or completely improved by stretching or moving.     The patient complains of problems with insomnia:  difficulty falling asleep and difficulty staying asleep. Wakes up several times per night.     The patient reports history of these childhood sleep problems: bedwetting at night    Sleep schedule: Bedtime: 6am , gets out of bed at 10pm, sleep latency: 30 mins, Gets about 8 hours of sleep.  Feels well rested during the day, usually no trouble going to sleep    EPWORTH SLEEPINESS SCALE  Sitting and reading 0 WatchingTV 0  Sitting, inactive, in a public place 0  As a passenger in a car for 1 hour w/o a break  2  Lying down to rest in the afternoon  2  Sitting and talking to someone  0  Sitting quietly after a lunch  0  In a car, while stopped for traffic or a light  0  Total 4    Review of Systems   HENT:  Positive for drooling, sinus pressure and sneezing.    Cardiovascular:  Positive for leg swelling.   Allergic/Immunologic: Positive for environmental allergies.   Hematological:  Bruises/bleeds easily.   All other systems reviewed and are negative.  There is no history of hypnagogic hallucinations, sleep paralysis or cataplexy.    Objective   Vital Signs:   /70   Pulse 91   Ht 165.1 cm (65\")   Wt 103 kg (227 lb)   BMI 37.77 kg/m²     Physical Exam  Vitals reviewed.   Constitutional:       Appearance: Normal appearance.   Cardiovascular:      Rate and Rhythm: Normal rate.      Pulses: Normal pulses.   Pulmonary:      Effort: Pulmonary effort is normal. No respiratory " distress.   Neurological:      Mental Status: She is alert and oriented to person, place, and time.   Psychiatric:         Mood and Affect: Mood normal.      Result Review :                 Assessment and Plan    Diagnoses and all orders for this visit:    1. FAITH (obstructive sleep apnea) (Primary)    Probable faith, will obtain hst and treat with cpap if indicated    Follow Up   Return for Follow Up visit 30 to 90 days after obtaining PAP.  Patient was given instructions and counseling regarding her condition or for health maintenance advice. Please see specific information pulled into the AVS if appropriate.       This document has been electronically signed by Joseph Seipel, MD on October 3, 2023 14:45 EDT

## 2023-10-25 ENCOUNTER — HOSPITAL ENCOUNTER (OUTPATIENT)
Dept: SLEEP MEDICINE | Facility: HOSPITAL | Age: 63
End: 2023-10-25
Payer: COMMERCIAL

## 2023-10-25 DIAGNOSIS — G47.33 OSA (OBSTRUCTIVE SLEEP APNEA): ICD-10-CM

## 2023-10-25 PROCEDURE — 95806 SLEEP STUDY UNATT&RESP EFFT: CPT

## 2023-10-25 PROCEDURE — 95806 SLEEP STUDY UNATT&RESP EFFT: CPT | Performed by: PSYCHIATRY & NEUROLOGY

## 2023-10-29 RX ORDER — NYSTATIN 100000 [USP'U]/G
POWDER TOPICAL 2 TIMES DAILY
Qty: 60 G | Refills: 0 | Status: SHIPPED | OUTPATIENT
Start: 2023-10-29

## 2023-10-30 PROBLEM — E07.9 THYROID DISEASE: Status: ACTIVE | Noted: 2023-10-30

## 2023-10-30 PROBLEM — K63.5 POLYP OF COLON: Status: ACTIVE | Noted: 2023-04-10

## 2023-10-31 ENCOUNTER — TELEPHONE (OUTPATIENT)
Dept: NEUROLOGY | Facility: CLINIC | Age: 63
End: 2023-10-31
Payer: COMMERCIAL

## 2023-10-31 DIAGNOSIS — G47.33 OSA (OBSTRUCTIVE SLEEP APNEA): Primary | ICD-10-CM

## 2023-10-31 NOTE — TELEPHONE ENCOUNTER
Caller: Ron Caba    Relationship: Self    Best call back number: [phone]    What test was performed: HOME SLEEP STUDY    When was the test performed: 10/26/2023    Where was the test performed: AT-HOME THROUGH [facility]    Additional notes: PT CALLING FOR SLEEP STUDY RESULTS. I ADVISED PT THAT ONCE REVIEWED BY DR. SEIPEL, A CLINICAL STAFF MEMBER WOULD BE REACHING OUT WITH THE RESULTS AND TO DISCUSS NEXT STEPS.    PLEASE REVIEW AND ADVISE.

## 2023-11-01 ENCOUNTER — TELEPHONE (OUTPATIENT)
Dept: NEUROLOGY | Facility: CLINIC | Age: 63
End: 2023-11-01

## 2023-11-01 NOTE — TELEPHONE ENCOUNTER
----- Message from Ron Caba sent at 11/1/2023  8:26 AM EDT -----  Regarding: Sleep CPAP  Contact: [phone]  Will you be sending the info to [facility] so I can  my CPAP  there?

## 2023-11-27 ENCOUNTER — OFFICE VISIT (OUTPATIENT)
Dept: FAMILY MEDICINE CLINIC | Facility: CLINIC | Age: 63
End: 2023-11-27
Payer: COMMERCIAL

## 2023-11-27 ENCOUNTER — LAB (OUTPATIENT)
Dept: FAMILY MEDICINE CLINIC | Facility: CLINIC | Age: 63
End: 2023-11-27
Payer: COMMERCIAL

## 2023-11-27 VITALS
RESPIRATION RATE: 16 BRPM | WEIGHT: 231.4 LBS | BODY MASS INDEX: 38.55 KG/M2 | SYSTOLIC BLOOD PRESSURE: 135 MMHG | HEIGHT: 65 IN | DIASTOLIC BLOOD PRESSURE: 78 MMHG | HEART RATE: 58 BPM | TEMPERATURE: 97.5 F | OXYGEN SATURATION: 94 %

## 2023-11-27 DIAGNOSIS — E10.40 TYPE 1 DIABETES MELLITUS WITH DIABETIC NEUROPATHY: ICD-10-CM

## 2023-11-27 DIAGNOSIS — Z00.00 PREVENTATIVE HEALTH CARE: Primary | ICD-10-CM

## 2023-11-27 PROBLEM — U07.1 ACUTE HYPOXEMIC RESPIRATORY FAILURE DUE TO COVID-19: Status: RESOLVED | Noted: 2023-02-15 | Resolved: 2023-11-27

## 2023-11-27 PROBLEM — R79.89 ELEVATED D-DIMER: Status: RESOLVED | Noted: 2023-02-15 | Resolved: 2023-11-27

## 2023-11-27 PROBLEM — E07.9 THYROID DISEASE: Status: RESOLVED | Noted: 2023-10-30 | Resolved: 2023-11-27

## 2023-11-27 PROBLEM — K63.5 POLYP OF COLON: Status: RESOLVED | Noted: 2023-04-10 | Resolved: 2023-11-27

## 2023-11-27 PROBLEM — J96.01 ACUTE HYPOXEMIC RESPIRATORY FAILURE DUE TO COVID-19: Status: RESOLVED | Noted: 2023-02-15 | Resolved: 2023-11-27

## 2023-11-27 LAB
BASOPHILS # BLD AUTO: 0.06 10*3/MM3 (ref 0–0.2)
BASOPHILS NFR BLD AUTO: 0.7 % (ref 0–1.5)
DEPRECATED RDW RBC AUTO: 41.7 FL (ref 37–54)
EOSINOPHIL # BLD AUTO: 0.32 10*3/MM3 (ref 0–0.4)
EOSINOPHIL NFR BLD AUTO: 4 % (ref 0.3–6.2)
ERYTHROCYTE [DISTWIDTH] IN BLOOD BY AUTOMATED COUNT: 13.4 % (ref 12.3–15.4)
HBA1C MFR BLD: 7.7 % (ref 4.8–5.6)
HCT VFR BLD AUTO: 43.6 % (ref 34–46.6)
HGB BLD-MCNC: 14.9 G/DL (ref 12–15.9)
IMM GRANULOCYTES # BLD AUTO: 0.03 10*3/MM3 (ref 0–0.05)
IMM GRANULOCYTES NFR BLD AUTO: 0.4 % (ref 0–0.5)
LYMPHOCYTES # BLD AUTO: 2.15 10*3/MM3 (ref 0.7–3.1)
LYMPHOCYTES NFR BLD AUTO: 26.8 % (ref 19.6–45.3)
MCH RBC QN AUTO: 29.7 PG (ref 26.6–33)
MCHC RBC AUTO-ENTMCNC: 34.2 G/DL (ref 31.5–35.7)
MCV RBC AUTO: 86.9 FL (ref 79–97)
MONOCYTES # BLD AUTO: 0.52 10*3/MM3 (ref 0.1–0.9)
MONOCYTES NFR BLD AUTO: 6.5 % (ref 5–12)
NEUTROPHILS NFR BLD AUTO: 4.93 10*3/MM3 (ref 1.7–7)
NEUTROPHILS NFR BLD AUTO: 61.6 % (ref 42.7–76)
NRBC BLD AUTO-RTO: 0 /100 WBC (ref 0–0.2)
PLATELET # BLD AUTO: 268 10*3/MM3 (ref 140–450)
PMV BLD AUTO: 10.6 FL (ref 6–12)
RBC # BLD AUTO: 5.02 10*6/MM3 (ref 3.77–5.28)
WBC NRBC COR # BLD AUTO: 8.01 10*3/MM3 (ref 3.4–10.8)

## 2023-11-27 PROCEDURE — 83036 HEMOGLOBIN GLYCOSYLATED A1C: CPT | Performed by: FAMILY MEDICINE

## 2023-11-27 PROCEDURE — 99396 PREV VISIT EST AGE 40-64: CPT | Performed by: FAMILY MEDICINE

## 2023-11-27 PROCEDURE — 85025 COMPLETE CBC W/AUTO DIFF WBC: CPT | Performed by: FAMILY MEDICINE

## 2023-11-27 RX ORDER — FLUCONAZOLE 150 MG/1
150 TABLET ORAL ONCE
Qty: 1 TABLET | Refills: 0 | Status: SHIPPED | OUTPATIENT
Start: 2023-11-27 | End: 2023-11-27

## 2023-11-27 NOTE — PROGRESS NOTES
Subjective   Chief Complaint   Patient presents with    Wellness Check    Annual Exam     Ron Caba is a 63 y.o. female.     Patient Care Team:  Hailey Rae MD as PCP - General  Bhavana Turner PA (Physician Assistant)    History of Present Illness  She is coming in today for her annual wellness exam.  She is followed by endocrinology office PA for the management of her type 1 diabetes, patient is on insulin pump.  Her hemoglobin A1c for some time has been staying around 8.5.  She reports keeping up with her diabetic eye exams.  She has had issues with recurrent skin yeast infection under the abdominal panniculus, this typically gets treated from our office with nystatin powder and always helps.  She wonders if there is anything else which can be done.  She also has a small skin spot under the left breast and would like that to be checked.  It has been there for quite some time and does not cause any discomfort.  She currently does not see a dermatologist. Patient does not report any chest pain, shortness of breath, dizziness, nausea, vomiting, or diarrhea, visual issues, headaches, numbness or tingling. No urinary issues reported like urgency, frequency, or discomfort upon urination.  No significant weight changes reported.  No swelling reported.  No rashes or any other skin issues reported. No emotional issues or insomnia.       The following portions of the patient's history were reviewed and updated as appropriate: allergies, current medications, past family history, past medical history, past social history, past surgical history, and problem list.  Past Medical History:   Diagnosis Date    Allergic     Arthritis     Asthma In chart    Breast cyst     Cataract     Diabetes type 1, uncontrolled     Fibrocystic breast     Hyperlipidemia     Hypothyroidism     Osteopenia     Urinary tract infection In chart    Vitamin D deficiency      Past Surgical History:   Procedure Laterality Date     CATARACT EXTRACTION       SECTION      COLONOSCOPY  2013    ENDOMETRIAL ABLATION       The patient has a family history of  Family History   Problem Relation Age of Onset    Hypertension Mother     Hyperlipidemia Mother     Hypothyroidism Mother     Hypertension Father     Hyperlipidemia Father     Osteoarthritis Father     Diabetes Brother      Social History     Socioeconomic History    Marital status:    Tobacco Use    Smoking status: Never    Smokeless tobacco: Never   Vaping Use    Vaping Use: Never used   Substance and Sexual Activity    Alcohol use: No    Drug use: No    Sexual activity: Not Currently     Partners: Male     Birth control/protection: None       Review of Systems   Constitutional:  Negative for activity change, appetite change, chills, fatigue, fever, unexpected weight gain and unexpected weight loss.   HENT:  Negative for congestion, ear pain, hearing loss, nosebleeds, postnasal drip, swollen glands, tinnitus and trouble swallowing.    Eyes:  Negative for blurred vision, double vision and visual disturbance.   Respiratory:  Negative for cough, choking, chest tightness, shortness of breath, wheezing and stridor.    Cardiovascular:  Negative for chest pain, palpitations and leg swelling.   Gastrointestinal:  Negative for abdominal distention, abdominal pain, anal bleeding, constipation, diarrhea, nausea, vomiting and GERD.   Endocrine: Negative for cold intolerance, heat intolerance and polyphagia.   Genitourinary:  Negative for dysuria, flank pain, frequency, hematuria and urgency.   Musculoskeletal:  Negative for arthralgias, back pain, gait problem, joint swelling and neck pain.   Skin:  Positive for rash and skin lesions. Negative for color change and dry skin.   Allergic/Immunologic: Negative for environmental allergies and food allergies.   Neurological:  Negative for dizziness, tremors, speech difficulty, light-headedness, numbness, headache and  "confusion.   Hematological:  Negative for adenopathy. Does not bruise/bleed easily.   Psychiatric/Behavioral:  Negative for decreased concentration, sleep disturbance, depressed mood and stress.      Visit Vitals  /78 (BP Location: Left arm, Patient Position: Sitting, Cuff Size: Large Adult)   Pulse 58   Temp 97.5 °F (36.4 °C) (Infrared)   Resp 16   Ht 165.1 cm (65\")   Wt 105 kg (231 lb 6.4 oz)   SpO2 94%   BMI 38.51 kg/m²              Current Outpatient Medications:     calcium carbonate (OS-CAT) 600 MG tablet, Take 1 tablet by mouth Daily., Disp: , Rfl:     cholecalciferol (VITAMIN D3) 25 MCG (1000 UT) tablet, Take 1 tablet by mouth Daily., Disp: , Rfl:     levothyroxine (SYNTHROID, LEVOTHROID) 25 MCG tablet, Take 1 tablet by mouth Daily., Disp: , Rfl:     NOVOLOG 100 UNIT/ML injection, INJECT 100 UNITS INTO THE SKIN QD, Disp: , Rfl:     nystatin (MYCOSTATIN) 490977 UNIT/GM powder, Apply  topically to the appropriate area as directed 2 (Two) Times a Day., Disp: 60 g, Rfl: 0    Omega-3 Fatty Acids (fish oil) 1000 MG capsule capsule, Take 1 capsule by mouth Daily With Breakfast., Disp: , Rfl:     rosuvastatin (CRESTOR) 10 MG tablet, Take 2 tablets by mouth Daily., Disp: , Rfl: 5    fluconazole (DIFLUCAN) 150 MG tablet, Take 1 tablet by mouth 1 (One) Time for 1 dose., Disp: 1 tablet, Rfl: 0    Objective   Physical Exam  Vitals and nursing note reviewed.   Constitutional:       General: She is not in acute distress.     Appearance: She is well-developed. She is not diaphoretic.   HENT:      Head: Normocephalic and atraumatic.      Right Ear: External ear normal.      Left Ear: External ear normal.   Eyes:      General: No scleral icterus.        Right eye: No discharge.         Left eye: No discharge.      Conjunctiva/sclera: Conjunctivae normal.      Pupils: Pupils are equal, round, and reactive to light.   Neck:      Thyroid: No thyromegaly.      Vascular: No JVD.   Cardiovascular:      Rate and Rhythm: Normal " rate and regular rhythm.      Heart sounds: Normal heart sounds. No murmur heard.     No friction rub. No gallop.   Pulmonary:      Effort: Pulmonary effort is normal. No respiratory distress.      Breath sounds: Normal breath sounds. No stridor. No wheezing, rhonchi or rales.   Abdominal:      General: Bowel sounds are normal. There is no distension.      Palpations: Abdomen is soft. There is no mass.      Tenderness: There is no abdominal tenderness. There is no guarding or rebound.      Hernia: No hernia is present.   Musculoskeletal:         General: No swelling, tenderness or deformity. Normal range of motion.      Cervical back: Normal range of motion and neck supple. No muscular tenderness.      Right lower leg: No edema.      Left lower leg: No edema.   Lymphadenopathy:      Cervical: No cervical adenopathy.   Skin:     General: Skin is warm and dry.      Capillary Refill: Capillary refill takes less than 2 seconds.      Coloration: Skin is not pale.      Findings: No bruising, erythema, lesion or rash.      Comments: There is a macular redness which is well-defined located under abdominal panniculus on the left side consistent with intertrigo dermatitis.  There is also a small pinkish skin lesion on the lower side of the left breast, it is few millimeters in diameter and clinically consistent with seborrheic keratosis.   Neurological:      General: No focal deficit present.      Mental Status: She is alert and oriented to person, place, and time.      Cranial Nerves: No cranial nerve deficit.      Sensory: No sensory deficit.      Motor: No weakness.      Coordination: Coordination normal.      Deep Tendon Reflexes: Reflexes normal.   Psychiatric:         Mood and Affect: Mood normal.         Behavior: Behavior normal.         Judgment: Judgment normal.       Assessment & Plan   Diagnoses and all orders for this visit:    1. Preventative health care (Primary)  -     CBC Auto Differential    2. Type 1  diabetes mellitus with diabetic neuropathy  -     Hemoglobin A1c    Other orders  -     fluconazole (DIFLUCAN) 150 MG tablet; Take 1 tablet by mouth 1 (One) Time for 1 dose.  Dispense: 1 tablet; Refill: 0      Wellness exam was done today - see above for details. Healthy life style was reviewed and discussed and re-enforced. Regular exercise and healthy diet were also discussed and recommended.  I reviewed her recent fasting blood work results.  I also reviewed her health maintenance.  Her last mammogram was in 8/2023.  Her last colonoscopy was in 4/2023 and 1 hyperplastic polyp was removed and 10-year follow-up colonoscopy was recommended.  Patient previously had DEXA scan in 2018 which showed some bone loss in osteopenia range.  Patient does not want to proceed with a repeat DEXA scan at this point and rather wants to wait until she turns 65.  She will continue calcium and vitamin D supplementation.  I also reviewed her vaccination and she is up to speed with her shots including most recent COVID-19 booster shot, flu shot, pneumonia shot, and RSV vaccine.  I also addressed her skin issues.  Skin care was discussed.  Prescription for Diflucan was given.  Patient was also advised to schedule an appointment with a dermatologist to address her concerns and also get a whole body skin check.        Return in about 1 year (around 11/27/2024) for Annual physical.    Requested Prescriptions     Signed Prescriptions Disp Refills    fluconazole (DIFLUCAN) 150 MG tablet 1 tablet 0     Sig: Take 1 tablet by mouth 1 (One) Time for 1 dose.

## 2023-12-01 NOTE — PROGRESS NOTES
"Chief Complaint  Sleep Apnea    Subjective          Ron Caba presents to [facility] NEUROLOGY  History of Present Illness  FAITH f/u patient states she is benefiting from pap therapy she uses nasal mask and goes through [facility] for supplies.    Sleep testing history:    On NPSG at [facility] , 10/25/23 patient had Mild obstructive sleep apnea syndrome with apnea-hypopnea index of 5.5 per sleep hour, minimum SpO2 of 86%    PAP download:  The patient is on CPAP therapy at 5-15 cm/H2O.   Data indicates Excellent compliance. With 90% usage for more than 4 hours with an average usage of 9 hours 25 minutes. AHI down to 0.8 .  Average pressures 7.2.  95%ile leak 1.8    The patient's hypersomnia has resolved       [place] Sleepiness Scale:  Sitting and reading 0 WatchingTV 2  Sitting, inactive, in a public place 0  As a passenger in a car for 1 hour w/o a break  2  Lying down to rest in the afternoon  3  Sitting and talking to someone  0  Sitting quietly after a lunch  0  In a car, while stopped for traffic or a light  0  Total 7    Review of Systems   Respiratory:  Negative for apnea and shortness of breath.    Psychiatric/Behavioral:  Negative for sleep disturbance.    All other systems reviewed and are negative.        Objective   Vital Signs:   /74   Pulse 80   Ht 165.1 cm (65\")   Wt 104 kg (230 lb)   BMI 38.27 kg/m²     Physical Exam  Vitals reviewed.   Cardiovascular:      Rate and Rhythm: Normal rate.   Pulmonary:      Effort: Pulmonary effort is normal. No respiratory distress.   Neurological:      Mental Status: She is alert and oriented to person, place, and time.   Psychiatric:         Mood and Affect: Mood normal.      Result Review :                 Assessment and Plan    Diagnoses and all orders for this visit:    1. FAITH (obstructive sleep apnea) (Primary)      Continue cpap min 5 max 15  The patient is compliant with and benefiting from PAP therapy.      Follow Up   Return in about 1 " year (around 12/5/2024).    Patient was given instructions and counseling regarding her condition or for health maintenance advice. Please see specific information pulled into the AVS if appropriate.       This document has been electronically signed by Joseph Seipel, MD on December 5, 2023 08:43 EST

## 2023-12-05 ENCOUNTER — OFFICE VISIT (OUTPATIENT)
Dept: NEUROLOGY | Facility: CLINIC | Age: 63
End: 2023-12-05
Payer: COMMERCIAL

## 2023-12-05 VITALS
WEIGHT: 230 LBS | SYSTOLIC BLOOD PRESSURE: 138 MMHG | BODY MASS INDEX: 38.32 KG/M2 | HEIGHT: 65 IN | HEART RATE: 80 BPM | DIASTOLIC BLOOD PRESSURE: 74 MMHG

## 2023-12-05 DIAGNOSIS — G47.33 OSA (OBSTRUCTIVE SLEEP APNEA): Primary | ICD-10-CM

## 2023-12-05 PROCEDURE — 99213 OFFICE O/P EST LOW 20 MIN: CPT | Performed by: PSYCHIATRY & NEUROLOGY

## 2023-12-05 RX ORDER — FLUCONAZOLE 150 MG/1
150 TABLET ORAL ONCE
COMMUNITY
Start: 2023-11-27

## 2024-01-10 RX ORDER — FLUCONAZOLE 150 MG/1
150 TABLET ORAL ONCE
Qty: 1 TABLET | Refills: 0 | Status: SHIPPED | OUTPATIENT
Start: 2024-01-10 | End: 2024-01-10

## 2024-02-23 RX ORDER — FLUCONAZOLE 150 MG/1
150 TABLET ORAL ONCE
Qty: 1 TABLET | Refills: 0 | Status: SHIPPED | OUTPATIENT
Start: 2024-02-23 | End: 2024-02-23

## 2024-04-02 RX ORDER — NYSTATIN 100000 [USP'U]/G
POWDER TOPICAL
Qty: 60 G | Refills: 0 | Status: SHIPPED | OUTPATIENT
Start: 2024-04-02

## 2024-07-04 DIAGNOSIS — Z12.31 VISIT FOR SCREENING MAMMOGRAM: Primary | ICD-10-CM

## 2024-08-05 ENCOUNTER — HOSPITAL ENCOUNTER (OUTPATIENT)
Dept: MAMMOGRAPHY | Facility: HOSPITAL | Age: 64
Discharge: HOME | End: 2024-08-05
Admitting: FAMILY MEDICINE
Payer: COMMERCIAL

## 2024-08-05 DIAGNOSIS — Z12.31 VISIT FOR SCREENING MAMMOGRAM: ICD-10-CM

## 2024-08-05 PROCEDURE — 77063 BREAST TOMOSYNTHESIS BI: CPT

## 2024-08-05 PROCEDURE — 77067 SCR MAMMO BI INCL CAD: CPT

## 2024-11-22 RX ORDER — AMOXICILLIN 875 MG/1
875 TABLET, COATED ORAL 2 TIMES DAILY
Qty: 20 TABLET | Refills: 0 | Status: SHIPPED | OUTPATIENT
Start: 2024-11-22

## 2024-11-22 RX ORDER — FLUCONAZOLE 150 MG/1
150 TABLET ORAL ONCE
Qty: 1 TABLET | Refills: 0 | Status: SHIPPED | OUTPATIENT
Start: 2024-11-22 | End: 2024-11-22

## 2024-12-02 ENCOUNTER — OFFICE VISIT (OUTPATIENT)
Dept: FAMILY MEDICINE CLINIC | Facility: CLINIC | Age: 64
End: 2024-12-02
Payer: COMMERCIAL

## 2024-12-02 VITALS
TEMPERATURE: 97.7 F | WEIGHT: 236 LBS | HEART RATE: 68 BPM | OXYGEN SATURATION: 94 % | RESPIRATION RATE: 16 BRPM | SYSTOLIC BLOOD PRESSURE: 138 MMHG | DIASTOLIC BLOOD PRESSURE: 80 MMHG | HEIGHT: 65 IN | BODY MASS INDEX: 39.32 KG/M2

## 2024-12-02 DIAGNOSIS — Z01.419 ENCOUNTER FOR GYNECOLOGICAL EXAMINATION WITHOUT ABNORMAL FINDING: Primary | ICD-10-CM

## 2024-12-02 DIAGNOSIS — Z12.11 COLON CANCER SCREENING: ICD-10-CM

## 2024-12-02 PROCEDURE — 99396 PREV VISIT EST AGE 40-64: CPT | Performed by: FAMILY MEDICINE

## 2024-12-02 PROCEDURE — 82270 OCCULT BLOOD FECES: CPT | Performed by: FAMILY MEDICINE

## 2024-12-02 RX ORDER — NYSTATIN 100000 [USP'U]/G
POWDER TOPICAL 2 TIMES DAILY
Qty: 60 G | Refills: 0 | Status: SHIPPED | OUTPATIENT
Start: 2024-12-02

## 2024-12-02 NOTE — PROGRESS NOTES
Subjective   Chief Complaint   Patient presents with    Annual Exam    Gynecologic Exam     Ron Caba is a 64 y.o. female.     Patient Care Team:  Hailey Rae MD as PCP - General  Bhavana Turner PA (Physician Assistant)    History of Present Illness  She is coming in today for her annual wellness exam and gynecological exam with Pap smear.  Patient's last menstrual period was at the age of 50.  She denies any abnormal vaginal bleeding or discharge.  She is followed by endocrinology office for the management of her type 1 diabetes and hypothyroidism.  Patient is on insulin pump.  She reports already having her flu shot and the COVID-19 shot.  She still works full-time as a teacher. Patient does not report any chest pain, shortness of breath, dizziness, nausea, vomiting, or diarrhea, visual issues, headaches, numbness or tingling. No urinary issues reported like urgency, frequency, or discomfort upon urination.  No significant weight changes reported.  No swelling reported.  No rashes or any other skin issues reported. No emotional issues or insomnia.       The following portions of the patient's history were reviewed and updated as appropriate: allergies, current medications, past family history, past medical history, past social history, past surgical history, and problem list.  Past Medical History:   Diagnosis Date    Allergic     Arthritis     Asthma In chart    Breast cyst     Cataract     Colon cancer screening 2021    Diabetes type 1, uncontrolled     Fibrocystic breast     Hyperlipidemia     Hypothyroidism     Osteopenia     Urinary tract infection In chart    Vitamin D deficiency      Past Surgical History:   Procedure Laterality Date    CATARACT EXTRACTION       SECTION      COLONOSCOPY  2013    ENDOMETRIAL ABLATION       The patient has a family history of  Family History   Problem Relation Age of Onset    Hypertension Mother     Hyperlipidemia Mother  "    Hypothyroidism Mother     Hypertension Father     Hyperlipidemia Father     Osteoarthritis Father     Diabetes Brother      Social History     Socioeconomic History    Marital status:    Tobacco Use    Smoking status: Never    Smokeless tobacco: Never   Vaping Use    Vaping status: Never Used   Substance and Sexual Activity    Alcohol use: No    Drug use: No    Sexual activity: Not Currently     Partners: Male     Birth control/protection: None       Review of Systems   Constitutional:  Negative for activity change, appetite change, unexpected weight gain and unexpected weight loss.   HENT:  Negative for ear pain, hearing loss, nosebleeds, postnasal drip, tinnitus and trouble swallowing.    Eyes:  Negative for blurred vision, double vision and visual disturbance.   Respiratory:  Negative for choking, chest tightness, shortness of breath, wheezing and stridor.    Cardiovascular:  Negative for palpitations and leg swelling.   Gastrointestinal:  Negative for abdominal distention, anal bleeding, constipation, diarrhea and GERD.   Endocrine: Negative for cold intolerance, heat intolerance and polyphagia.   Genitourinary:  Negative for breast discharge, breast lump, breast pain, flank pain, frequency, hematuria, pelvic pain, urgency, vaginal discharge and vaginal pain.   Musculoskeletal:  Negative for arthralgias, back pain, gait problem and joint swelling.   Skin:  Negative for color change, dry skin and skin lesions.   Allergic/Immunologic: Negative for environmental allergies and food allergies.   Neurological:  Negative for dizziness, tremors, speech difficulty, light-headedness, headache and confusion.   Hematological:  Negative for adenopathy. Does not bruise/bleed easily.   Psychiatric/Behavioral:  Negative for decreased concentration, sleep disturbance, depressed mood and stress.      Visit Vitals  /80   Pulse 68   Temp 97.7 °F (36.5 °C) (Temporal)   Resp 16   Ht 165.1 cm (65\")   Wt 107 kg (236 " lb)   SpO2 94%   BMI 39.27 kg/m²       Class 2 Severe Obesity (BMI >=35 and <=39.9). Obesity-related health conditions include the following: none. Obesity is unchanged. BMI is is above average; BMI management plan is completed. We discussed portion control and increasing exercise.      Current Outpatient Medications:     nystatin [id number] UNIT/GM powder, Apply  topically to the appropriate area as directed 2 (Two) Times a Day., Disp: 60 g, Rfl: 0    calcium carbonate (OS-CAT) 600 MG tablet, Take 1 tablet by mouth Daily., Disp: , Rfl:     cholecalciferol (VITAMIN D3) 25 MCG (1000 UT) tablet, Take 1 tablet by mouth Daily., Disp: , Rfl:     levothyroxine (SYNTHROID, LEVOTHROID) 25 MCG tablet, Take 1 tablet by mouth Daily., Disp: , Rfl:     NOVOLOG 100 UNIT/ML injection, INJECT 100 UNITS INTO THE SKIN QD, Disp: , Rfl:     Omega-3 Fatty Acids (fish oil) 1000 MG capsule capsule, Take 1 capsule by mouth Daily With Breakfast., Disp: , Rfl:     rosuvastatin (CRESTOR) 10 MG tablet, Take 2 tablets by mouth Daily., Disp: , Rfl: 5    Objective   Physical Exam  Vitals and nursing note reviewed.   Constitutional:       General: She is not in acute distress.     Appearance: She is well-developed. She is not diaphoretic.   HENT:      Head: Normocephalic and atraumatic.      Right Ear: External ear normal.      Left Ear: External ear normal.   Eyes:      General: No scleral icterus.        Right eye: No discharge.         Left eye: No discharge.      Conjunctiva/sclera: Conjunctivae normal.      Pupils: Pupils are equal, round, and reactive to light.   Neck:      Thyroid: No thyromegaly.      Vascular: No JVD.   Cardiovascular:      Rate and Rhythm: Normal rate and regular rhythm.      Heart sounds: Normal heart sounds. No murmur heard.     No friction rub. No gallop.   Pulmonary:      Effort: Pulmonary effort is normal. No respiratory distress.      Breath sounds: Normal breath sounds. No stridor. No wheezing, rhonchi or rales.    Chest:   Breasts:     Right: No swelling, bleeding, inverted nipple, mass, nipple discharge, skin change or tenderness.      Left: No swelling, bleeding, inverted nipple, mass, nipple discharge, skin change or tenderness.   Abdominal:      General: Bowel sounds are normal. There is no distension.      Palpations: Abdomen is soft. There is no mass.      Tenderness: There is no abdominal tenderness. There is no guarding or rebound.      Hernia: No hernia is present.   Genitourinary:     Vagina: No signs of injury and foreign body. No vaginal discharge, erythema, tenderness, bleeding, lesions or prolapsed vaginal walls.      Cervix: No cervical motion tenderness, discharge, friability, lesion, erythema, cervical bleeding or eversion.      Uterus: Not deviated, not enlarged, not fixed, not tender and no uterine prolapse.       Adnexa:         Right: No mass, tenderness or fullness.          Left: No mass, tenderness or fullness.        Rectum: Guaiac result negative. No mass, tenderness, anal fissure, external hemorrhoid or internal hemorrhoid. Normal anal tone.      Comments: Vaginal exam is consistent with postmenopausal findings with smooth vaginal walls and tighter vaginal canal.  Musculoskeletal:         General: No swelling, tenderness or deformity. Normal range of motion.      Cervical back: Normal range of motion and neck supple. No muscular tenderness.      Right lower leg: No edema.      Left lower leg: No edema.   Lymphadenopathy:      Cervical: No cervical adenopathy.   Skin:     General: Skin is warm and dry.      Capillary Refill: Capillary refill takes less than 2 seconds.      Coloration: Skin is not pale.      Findings: No bruising, erythema, lesion or rash.   Neurological:      General: No focal deficit present.      Mental Status: She is alert and oriented to person, place, and time.      Cranial Nerves: No cranial nerve deficit.      Sensory: No sensory deficit.      Motor: No weakness.       Coordination: Coordination normal.      Deep Tendon Reflexes: Reflexes normal.   Psychiatric:         Mood and Affect: Mood normal.         Behavior: Behavior normal.         Judgment: Judgment normal.         Assessment & Plan   Diagnoses and all orders for this visit:    1. Encounter for gynecological examination without abnormal finding (Primary)  -     IGP,Aptima HPV,Age Gdln; Future  -     IGP,Aptima HPV,Age Gdln    2. Colon cancer screening  -     POC Occult Blood Stool    Other orders  -     nystatin [id number] UNIT/GM powder; Apply  topically to the appropriate area as directed 2 (Two) Times a Day.  Dispense: 60 g; Refill: 0      Wellness exam with Pap smear was done today - see above for details. Healthy life style was reviewed and discussed and re-enforced. Regular exercise and healthy diet were also discussed and recommended.  I reviewed her labs which she had done through the endocrinology office.  I also reviewed her health maintenance.  Her last mammogram was in 8/2024.  Her last colonoscopy was in 4/2023 and 1 hyperplastic polyp was removed and 10-year follow-up colonoscopy was recommended.  Immunization was also reviewed and recommended and patient is up to speed with her vaccination including pneumonia shots, COVID vaccination, and a flu shot.  She also previously had Shingrix vaccination series.        Return in about 1 year (around 12/2/2025) for Annual physical.    Requested Prescriptions     Signed Prescriptions Disp Refills    nystatin [id number] UNIT/GM powder 60 g 0     Sig: Apply  topically to the appropriate area as directed 2 (Two) Times a Day.       Hailey Rae MD  12/02/2024

## 2024-12-09 LAB
AGE GDLN ACOG TESTING: NORMAL
CYTOLOGIST CVX/VAG CYTO: NORMAL
CYTOLOGY CVX/VAG DOC CYTO: NORMAL
CYTOLOGY CVX/VAG DOC THIN PREP: NORMAL
DX ICD CODE: NORMAL
HPV GENOTYPE REFLEX: NORMAL
HPV I/H RISK 4 DNA CVX QL PROBE+SIG AMP: NEGATIVE
Lab: NORMAL
OTHER STN SPEC: NORMAL
STAT OF ADQ CVX/VAG CYTO-IMP: NORMAL

## 2025-01-15 NOTE — PROGRESS NOTES
"Chief Complaint  Sleep Apnea    Subjective          Ron Caba presents to [facility] NEUROLOGY  History of Present Illness  FAITH f/u patient states she is benefiting from pap therapy she uses nasal mask and goes through [facility] for supplies.   sleeps better with cpap than not   Sleep testing history:    On NPSG at [facility] , 10/25/23 patient had Mild obstructive sleep apnea syndrome with apnea-hypopnea index of 5.5 per sleep hour, minimum SpO2 of 86%    PAP download:  The patient is on CPAP therapy at 5-15 cm/H2O.   Data indicates Excellent compliance. With 93% usage for more than 4 hours with an average usage of 8 hours 43 minutes. AHI down to 0.6 .  Average pressures 6.7.  Average large leak-- none.     The patient's hypersomnia has resolved       [place] Sleepiness Scale:  Sitting and reading JS [place] Sleepiness: 0 WatchingTV JS [place] Sleepiness: 0  Sitting, inactive, in a public place JS [place] Sleepiness: 0  As a passenger in a car for 1 hour w/o a break  JS [place] Sleepiness: 2  Lying down to rest in the afternoon  JS [place] Sleepiness: 2  Sitting and talking to someone  JS [place] Sleepiness: 0  Sitting quietly after a lunch  JS [place] Sleepiness: 0  In a car, while stopped for traffic or a light  JS [place] Sleepiness: 0  Total 4    Review of Systems   Constitutional:  Negative for fatigue.   Eyes:  Negative for visual disturbance.   Neurological:  Negative for seizures.   Psychiatric/Behavioral:  The patient is not hyperactive.    All other systems reviewed and are negative.        Objective   Vital Signs:   /73   Pulse 84   Resp 16   Ht 165.1 cm (65\")   Wt 108 kg (239 lb)   BMI 39.77 kg/m²     Physical Exam  Vitals reviewed.   Constitutional:       Appearance: Normal appearance.   Pulmonary:      Effort: Pulmonary effort is normal.   Neurological:      Mental Status: She is alert and oriented to person, place, and time.   Psychiatric:         Mood and Affect: Mood normal. "        Result Review :                 Assessment and Plan    Diagnoses and all orders for this visit:    1. FAITH (obstructive sleep apnea) (Primary)      Continue CPAP 5-15,  The patient is compliant with and benefiting from PAP therapy.      Follow Up   Return in about 1 year (around 1/20/2026).    Patient was given instructions and counseling regarding her condition or for health maintenance advice. Please see specific information pulled into the AVS if appropriate.       This document has been electronically signed by Joseph Seipel, MD on January 20, 2025 08:40 EST

## 2025-01-20 ENCOUNTER — OFFICE VISIT (OUTPATIENT)
Dept: NEUROLOGY | Facility: CLINIC | Age: 65
End: 2025-01-20
Payer: COMMERCIAL

## 2025-01-20 VITALS
SYSTOLIC BLOOD PRESSURE: 144 MMHG | DIASTOLIC BLOOD PRESSURE: 73 MMHG | HEART RATE: 84 BPM | BODY MASS INDEX: 39.82 KG/M2 | WEIGHT: 239 LBS | RESPIRATION RATE: 16 BRPM | HEIGHT: 65 IN

## 2025-01-20 DIAGNOSIS — G47.33 OSA (OBSTRUCTIVE SLEEP APNEA): Primary | ICD-10-CM

## 2025-01-20 PROCEDURE — 99213 OFFICE O/P EST LOW 20 MIN: CPT | Performed by: PSYCHIATRY & NEUROLOGY

## 2025-01-20 RX ORDER — ROSUVASTATIN CALCIUM 20 MG/1
1 TABLET, COATED ORAL DAILY
COMMUNITY
Start: 2025-01-01

## 2025-01-20 RX ORDER — ALBUTEROL SULFATE 90 UG/1
2 INHALANT RESPIRATORY (INHALATION)
COMMUNITY
Start: 2024-12-06